# Patient Record
Sex: MALE | Race: WHITE | NOT HISPANIC OR LATINO | Employment: FULL TIME | URBAN - NONMETROPOLITAN AREA
[De-identification: names, ages, dates, MRNs, and addresses within clinical notes are randomized per-mention and may not be internally consistent; named-entity substitution may affect disease eponyms.]

---

## 2017-11-21 ENCOUNTER — HISTORY (OUTPATIENT)
Dept: FAMILY MEDICINE | Facility: OTHER | Age: 57
End: 2017-11-21

## 2017-11-21 ENCOUNTER — HOSPITAL ENCOUNTER (OUTPATIENT)
Dept: RADIOLOGY | Facility: OTHER | Age: 57
End: 2017-11-21
Attending: FAMILY MEDICINE

## 2017-11-21 ENCOUNTER — OFFICE VISIT - GICH (OUTPATIENT)
Dept: FAMILY MEDICINE | Facility: OTHER | Age: 57
End: 2017-11-21

## 2017-11-21 DIAGNOSIS — Z12.5 ENCOUNTER FOR SCREENING FOR MALIGNANT NEOPLASM OF PROSTATE: ICD-10-CM

## 2017-11-21 DIAGNOSIS — Z13.228 ENCOUNTER FOR SCREENING FOR OTHER METABOLIC DISORDERS: ICD-10-CM

## 2017-11-21 DIAGNOSIS — M12.88 OTHER SPECIFIC ARTHROPATHIES, NOT ELSEWHERE CLASSIFIED, OTHER SPECIFIED SITE: ICD-10-CM

## 2017-11-21 DIAGNOSIS — Z13.0 ENCOUNTER FOR SCREENING FOR DISEASES OF THE BLOOD AND BLOOD-FORMING ORGANS AND CERTAIN DISORDERS INVOLVING THE IMMUNE MECHANISM: ICD-10-CM

## 2017-11-21 DIAGNOSIS — Z00.00 ENCOUNTER FOR GENERAL ADULT MEDICAL EXAMINATION WITHOUT ABNORMAL FINDINGS: ICD-10-CM

## 2017-11-21 DIAGNOSIS — Z13.220 ENCOUNTER FOR SCREENING FOR LIPOID DISORDERS: ICD-10-CM

## 2017-11-21 DIAGNOSIS — Z13.6 ENCOUNTER FOR SCREENING FOR CARDIOVASCULAR DISORDERS: ICD-10-CM

## 2017-11-21 DIAGNOSIS — S90.222A: ICD-10-CM

## 2017-11-21 LAB
A/G RATIO - HISTORICAL: 1.6 (ref 1–2)
ABSOLUTE BASOPHILS - HISTORICAL: 0.1 THOU/CU MM
ABSOLUTE EOSINOPHILS - HISTORICAL: 0.2 THOU/CU MM
ABSOLUTE IMMATURE GRANULOCYTES(METAS,MYELOS,PROS) - HISTORICAL: 0 THOU/CU MM
ABSOLUTE LYMPHOCYTES - HISTORICAL: 1.6 THOU/CU MM (ref 0.9–2.9)
ABSOLUTE MONOCYTES - HISTORICAL: 0.5 THOU/CU MM
ABSOLUTE NEUTROPHILS - HISTORICAL: 3.8 THOU/CU MM (ref 1.7–7)
ALBUMIN SERPL-MCNC: 4.3 G/DL (ref 3.5–5.7)
ALP SERPL-CCNC: 60 IU/L (ref 34–104)
ALT (SGPT) - HISTORICAL: 21 IU/L (ref 7–52)
ANION GAP - HISTORICAL: 9 (ref 5–18)
AST SERPL-CCNC: 23 IU/L (ref 13–39)
BASOPHILS # BLD AUTO: 1.1 %
BILIRUB SERPL-MCNC: 0.7 MG/DL (ref 0.3–1)
BUN SERPL-MCNC: 20 MG/DL (ref 7–25)
BUN/CREAT RATIO - HISTORICAL: 23
CALCIUM SERPL-MCNC: 9.4 MG/DL (ref 8.6–10.3)
CHLORIDE SERPLBLD-SCNC: 106 MMOL/L (ref 98–107)
CHOL/HDL RATIO - HISTORICAL: 4.35
CHOLESTEROL TOTAL: 174 MG/DL
CO2 SERPL-SCNC: 22 MMOL/L (ref 21–31)
CREAT SERPL-MCNC: 0.86 MG/DL (ref 0.7–1.3)
EOSINOPHIL NFR BLD AUTO: 2.4 %
ERYTHROCYTE [DISTWIDTH] IN BLOOD BY AUTOMATED COUNT: 12.5 % (ref 11.5–15.5)
GFR IF NOT AFRICAN AMERICAN - HISTORICAL: >60 ML/MIN/1.73M2
GLOBULIN - HISTORICAL: 2.7 G/DL (ref 2–3.7)
GLUCOSE SERPL-MCNC: 85 MG/DL (ref 70–105)
HCT VFR BLD AUTO: 43.4 % (ref 37–53)
HDLC SERPL-MCNC: 40 MG/DL (ref 23–92)
HEMOGLOBIN: 14.7 G/DL (ref 13.5–17.5)
IMMATURE GRANULOCYTES(METAS,MYELOS,PROS) - HISTORICAL: 0.2 %
LDLC SERPL CALC-MCNC: 110 MG/DL
LYMPHOCYTES NFR BLD AUTO: 25.2 % (ref 20–44)
MCH RBC QN AUTO: 30.6 PG (ref 26–34)
MCHC RBC AUTO-ENTMCNC: 33.9 G/DL (ref 32–36)
MCV RBC AUTO: 90 FL (ref 80–100)
MONOCYTES NFR BLD AUTO: 8.8 %
NEUTROPHILS NFR BLD AUTO: 62.3 % (ref 42–72)
NON-HDL CHOLESTEROL - HISTORICAL: 134 MG/DL
PLATELET # BLD AUTO: 256 THOU/CU MM (ref 140–440)
PMV BLD: 10.3 FL (ref 6.5–11)
POTASSIUM SERPL-SCNC: 4.3 MMOL/L (ref 3.5–5.1)
PROT SERPL-MCNC: 7 G/DL (ref 6.4–8.9)
PROVIDER ORDERDED STATUS - HISTORICAL: ABNORMAL
PSA TOTAL (DIAGNOSTIC) - HISTORICAL: 2.79 NG/ML
RED BLOOD COUNT - HISTORICAL: 4.8 MIL/CU MM (ref 4.3–5.9)
SODIUM SERPL-SCNC: 137 MMOL/L (ref 133–143)
TRIGL SERPL-MCNC: 118 MG/DL
WHITE BLOOD COUNT - HISTORICAL: 6.2 THOU/CU MM (ref 4.5–11)

## 2017-11-21 ASSESSMENT — PATIENT HEALTH QUESTIONNAIRE - PHQ9: SUM OF ALL RESPONSES TO PHQ QUESTIONS 1-9: 1

## 2017-11-22 ENCOUNTER — HOSPITAL ENCOUNTER (OUTPATIENT)
Dept: RADIOLOGY | Facility: OTHER | Age: 57
End: 2017-11-22
Attending: FAMILY MEDICINE

## 2017-11-22 DIAGNOSIS — Z13.6 ENCOUNTER FOR SCREENING FOR CARDIOVASCULAR DISORDERS: ICD-10-CM

## 2017-12-07 ENCOUNTER — OFFICE VISIT - GICH (OUTPATIENT)
Dept: CHIROPRACTIC MEDICINE | Facility: OTHER | Age: 57
End: 2017-12-07

## 2017-12-07 DIAGNOSIS — M99.01 SEGMENTAL AND SOMATIC DYSFUNCTION OF CERVICAL REGION: ICD-10-CM

## 2017-12-07 DIAGNOSIS — M99.02 SEGMENTAL AND SOMATIC DYSFUNCTION OF THORACIC REGION: ICD-10-CM

## 2017-12-07 DIAGNOSIS — M51.34 OTHER INTERVERTEBRAL DISC DEGENERATION, THORACIC REGION: ICD-10-CM

## 2017-12-07 DIAGNOSIS — M12.88 OTHER SPECIFIC ARTHROPATHIES, NOT ELSEWHERE CLASSIFIED, OTHER SPECIFIED SITE: ICD-10-CM

## 2017-12-07 DIAGNOSIS — M54.2 CERVICALGIA: ICD-10-CM

## 2017-12-11 ENCOUNTER — OFFICE VISIT - GICH (OUTPATIENT)
Dept: CHIROPRACTIC MEDICINE | Facility: OTHER | Age: 57
End: 2017-12-11

## 2017-12-11 ENCOUNTER — AMBULATORY - GICH (OUTPATIENT)
Dept: FAMILY MEDICINE | Facility: OTHER | Age: 57
End: 2017-12-11

## 2017-12-11 DIAGNOSIS — D22.9 MELANOCYTIC NEVUS: ICD-10-CM

## 2017-12-11 DIAGNOSIS — M99.03 SEGMENTAL AND SOMATIC DYSFUNCTION OF LUMBAR REGION: ICD-10-CM

## 2017-12-11 DIAGNOSIS — M54.2 CERVICALGIA: ICD-10-CM

## 2017-12-11 DIAGNOSIS — M99.01 SEGMENTAL AND SOMATIC DYSFUNCTION OF CERVICAL REGION: ICD-10-CM

## 2017-12-11 DIAGNOSIS — M12.88 OTHER SPECIFIC ARTHROPATHIES, NOT ELSEWHERE CLASSIFIED, OTHER SPECIFIED SITE: ICD-10-CM

## 2017-12-11 DIAGNOSIS — M51.34 OTHER INTERVERTEBRAL DISC DEGENERATION, THORACIC REGION: ICD-10-CM

## 2017-12-11 DIAGNOSIS — M99.02 SEGMENTAL AND SOMATIC DYSFUNCTION OF THORACIC REGION: ICD-10-CM

## 2017-12-18 ENCOUNTER — AMBULATORY - GICH (OUTPATIENT)
Dept: FAMILY MEDICINE | Facility: OTHER | Age: 57
End: 2017-12-18

## 2017-12-18 DIAGNOSIS — L60.8 OTHER NAIL DISORDERS: ICD-10-CM

## 2017-12-18 DIAGNOSIS — D22.9 MELANOCYTIC NEVUS: ICD-10-CM

## 2017-12-28 ENCOUNTER — AMBULATORY - GICH (OUTPATIENT)
Dept: SCHEDULING | Facility: OTHER | Age: 57
End: 2017-12-28

## 2017-12-28 NOTE — PROGRESS NOTES
Patient Information     Patient Name MRN Sex Alfie Nash [id number] Male 1960      Progress Notes by Ruben Ramos MD at 2017 10:15 AM     Author:  Ruben Ramos MD Service:  (none) Author Type:  Physician     Filed:  2017  1:24 PM Encounter Date:  2017 Status:  Signed     :  Ruben Ramos MD (Physician)            Nursing Notes:   Caitlyn Sepulveda  2017 10:36 AM  Signed  He has had back pain for 1.5 years and it is not getting any better. He also has a funny lesion on his left great toe.  Caitlyn Sepulveda LPN..................2017   10:30 AM      SUBJECTIVE:  Alfie Otoole  is a 57 y.o. male who comes in today for a couple concerns.    He's had back pain for a year and a half and it doesn't seem to be getting any better. He has had mid to low thoracic back pain that is better with ibuprofen. It seems to be getting worse. It started after skiing. It has been more consistent of late. He used to do a lot of heavy backpacking and had an old hyperextension injury.      He has a lesion on his left great toe he likely look at.    Past Medical, Family, and Social History reviewed and updated as noted below.   ROS is negative except as noted above       No Known Allergies,   Family History       Problem   Relation Age of Onset     Heart Disease  Father      CHF       Cancer-breast  Mother      Heart Disease  Mother      afib       Cancer-prostate  Maternal Grandfather      Coronary artery disease  Maternal Uncle      smoker     ,   No current outpatient prescriptions on file prior to visit.     No current facility-administered medications on file prior to visit.    ,   Past Medical History:     Diagnosis  Date     Chronic back pain     Chronic back pain    ,   Patient Active Problem List      Diagnosis Date Noted     GERD 2012     CONSTIPATION 2012   ,   Past Surgical History:      Procedure  Laterality Date     COLON EGD  2012    F/U   EGD /  "Colonoscopy      and   Social History       Substance Use Topics         Smoking status:   Never Smoker     Smokeless tobacco:   Never Used     Alcohol use   Yes      Comment: once or twice a week one beer      OBJECTIVE:  /74  Pulse 68  Ht 1.911 m (6' 3.25\")  Wt 106.5 kg (234 lb 12.8 oz)  BMI 29.15 kg/m2   EXAM:  General Appearance: Pleasant, alert, appropriate appearance for age. No acute distress  Head Exam: Normal. Normocephalic, atraumatic.  Eye Exam:  Normal external eye, conjunctiva, lids, cornea. BEN. EOMI  Ear Exam: Normal TM's bilaterally. Normal auditory canals and external ears. Non-tender.  Nose Exam: Normal external nose, mucus membranes, and septum.  OroPharynx Exam:  Dental hygiene adequate. Normal buccal mucosa. Normal pharynx.  Neck Exam:  Supple, no masses or nodes. No audible bruits  Thyroid Exam: No nodules or enlargement.  Chest/Respiratory Exam: Normal chest wall and respirations. Clear to auscultation.  Cardiovascular Exam: Regular rate and rhythm. S1, S2, no murmur, click, gallop, or rubs.  Gastrointestinal Exam: Soft, non-tender, no masses or organomegaly.  Lymphatic Exam: Non-palpable nodes in neck, clavicular, axillary, or inguinal regions.  Musculoskeletal Exam: Back is straight and non-tender, full ROM of upper and lower extremities. Lipoma on the lumbar region on the right. He has some asymmetry at about T9 on the right but he has normal rotational range of motion. Some spasm noted in the right T9 area.  Foot Exam: Left and right foot: good pedal pulses, no lesions, nail hygiene good. left great toenail with dark lesion at the base of the nail. Appears to be a resolving subungual hematoma under magnification. Ridge on the nail distal to the dark area and base seems to be lightening.  Skin: no rash or abnormalities  Neurologic Exam: Nonfocal, normal gross motor, tone coordination and no tremor.  Psychiatric Exam: Alert and oriented - appropriate affect.     Results for orders " placed or performed in visit on 11/21/17      LIPID PANEL      Result  Value Ref Range    CHOLESTEROL,TOTAL 174 <200 mg/dL    TRIGLYCERIDES 118 <150 mg/dL    HDL CHOLESTEROL 40 23 - 92 mg/dL    NON-HDL CHOLESTEROL 134 <145 mg/dl    CHOL/HDL RATIO            4.35 <4.50                    LDL CHOLESTEROL 110 (H) <100 mg/dL    PROVIDER ORDERED STATUS RANDOM    PSA TOTAL (DIAGNOSTIC)      Result  Value Ref Range    PSA TOTAL (DIAGNOSTIC) 2.788 <=3.100 ng/mL   COMP METABOLIC PANEL      Result  Value Ref Range    SODIUM 137 133 - 143 mmol/L    POTASSIUM 4.3 3.5 - 5.1 mmol/L    CHLORIDE 106 98 - 107 mmol/L    CO2,TOTAL 22 21 - 31 mmol/L    ANION GAP 9 5 - 18                    GLUCOSE 85 70 - 105 mg/dL    CALCIUM 9.4 8.6 - 10.3 mg/dL    BUN 20 7 - 25 mg/dL    CREATININE 0.86 0.70 - 1.30 mg/dL    BUN/CREAT RATIO           23                    GFR if African American >60 >60 ml/min/1.73m2    GFR if not African American >60 >60 ml/min/1.73m2    ALBUMIN 4.3 3.5 - 5.7 g/dL    PROTEIN,TOTAL 7.0 6.4 - 8.9 g/dL    GLOBULIN                  2.7 2.0 - 3.7 g/dL    A/G RATIO 1.6 1.0 - 2.0                    BILIRUBIN,TOTAL 0.7 0.3 - 1.0 mg/dL    ALK PHOSPHATASE 60 34 - 104 IU/L    ALT (SGPT) 21 7 - 52 IU/L    AST (SGOT) 23 13 - 39 IU/L   CBC WITH AUTO DIFFERENTIAL      Result  Value Ref Range    WHITE BLOOD COUNT         6.2 4.5 - 11.0 thou/cu mm    RED BLOOD COUNT           4.80 4.30 - 5.90 mil/cu mm    HEMOGLOBIN                14.7 13.5 - 17.5 g/dL    HEMATOCRIT                43.4 37.0 - 53.0 %    MCV                       90 80 - 100 fL    MCH                       30.6 26.0 - 34.0 pg    MCHC                      33.9 32.0 - 36.0 g/dL    RDW                       12.5 11.5 - 15.5 %    PLATELET COUNT            256 140 - 440 thou/cu mm    MPV                       10.3 6.5 - 11.0 fL    NEUTROPHILS               62.3 42.0 - 72.0 %    LYMPHOCYTES               25.2 20.0 - 44.0 %    MONOCYTES                 8.8 <12.0 %     EOSINOPHILS               2.4 <8.0 %    BASOPHILS                 1.1 <3.0 %    IMMATURE GRANULOCYTES(METAS,MYELOS,PROS) 0.2 %    ABSOLUTE NEUTROPHILS      3.8 1.7 - 7.0 thou/cu mm    ABSOLUTE LYMPHOCYTES      1.6 0.9 - 2.9 thou/cu mm    ABSOLUTE MONOCYTES        0.5 <0.9 thou/cu mm    ABSOLUTE EOSINOPHILS      0.2 <0.5 thou/cu mm    ABSOLUTE BASOPHILS        0.1 <0.3 thou/cu mm    ABSOLUTE IMMATURE GRANULOCYTES(METAS,MYELOS,PROS) 0.0 <=0.3 thou/cu mm      XR SPINE THORACIC 2 VIEWS     HISTORY:  Thoracic facet joint syndrome. Midthoracic spine pain     TECHNIQUE: Thoracic spine 2 views.     COMPARISON: None.     FINDINGS:     There is mild reverse S shaped scoliosis. Vertebral body heights are maintained. Disc spaces are mildly narrowed with degenerative endplate changes and osteophytes. No acute fracture is seen.     IMPRESSION: Mild scoliosis and multilevel degenerative disease.       Electronically Signed By: Sheron Franco M.D. on 11/21/2017 11:42 AM  ASSESSMENT/Plan :      Alfie was seen today for back pain.    Diagnoses and all orders for this visit:    Visit for preventive health examination    Thoracic facet joint syndrome  -     XR SPINE THORACIC 2 VIEWS; Future  -     AMB CONSULT TO CHIROPRACTIC; Future    Screening for hyperlipidemia  -     LIPID PANEL; Future  -     LIPID PANEL    Screening for prostate cancer  -     PSA TOTAL (DIAGNOSTIC); Future  -     PSA TOTAL (DIAGNOSTIC)    Screening for deficiency anemia  -     CBC AND DIFFERENTIAL; Future  -     CBC AND DIFFERENTIAL  -     CBC WITH AUTO DIFFERENTIAL    Screening for metabolic disorder  -     COMP METABOLIC PANEL; Future  -     COMP METABOLIC PANEL    Screening for cardiovascular condition  -     CT CARDIAC CALCIUM SCORE ONLY WO SINGLE READ; Future     mild scoliosis likely predisposes to thoracic facet dysfunction. Referred for chiropractic evaluation. Might consider referral to physical therapy as well. Reviewed x-ray with him which shows  degenerative change.    Will notify of lab results when available. We'll go ahead with CT cardiac calcium score to see if we need to risk stratify him to a higher risk group.    Reassured with regard to this lesion under his toenail which I think is just a resolving subungual hematoma. We will employ expectant management and reevaluate if it persists over several months.      Ruben Ramos MD

## 2017-12-30 NOTE — NURSING NOTE
Patient Information     Patient Name MRN Sex Alfie Nash [id number] Male 1960      Nursing Note by Caitlyn Sepulveda at 2017 10:15 AM     Author:  Caitlyn Sepulveda Service:  (none) Author Type:  (none)     Filed:  2017 10:36 AM Encounter Date:  2017 Status:  Signed     :  Caitlyn Sepulveda            He has had back pain for 1.5 years and it is not getting any better. He also has a funny lesion on his left great toe.  Caitlyn Sepulveda LPEDITH..................2017   10:30 AM

## 2018-01-27 VITALS
HEIGHT: 75 IN | DIASTOLIC BLOOD PRESSURE: 74 MMHG | SYSTOLIC BLOOD PRESSURE: 138 MMHG | HEART RATE: 68 BPM | BODY MASS INDEX: 29.19 KG/M2 | WEIGHT: 234.8 LBS

## 2018-02-04 ASSESSMENT — PATIENT HEALTH QUESTIONNAIRE - PHQ9: SUM OF ALL RESPONSES TO PHQ QUESTIONS 1-9: 1

## 2018-02-09 VITALS
SYSTOLIC BLOOD PRESSURE: 125 MMHG | WEIGHT: 232.4 LBS | HEIGHT: 75 IN | TEMPERATURE: 98.9 F | BODY MASS INDEX: 28.89 KG/M2 | RESPIRATION RATE: 16 BRPM | DIASTOLIC BLOOD PRESSURE: 75 MMHG | HEART RATE: 58 BPM

## 2018-02-12 NOTE — PATIENT INSTRUCTIONS
Patient Information     Patient Name MRN Sex Alfie Nash [id number] Male 1960      Patient Instructions by Nunu Hernandez DC at 2017  1:30 PM     Author:  Nunu Hernandez DC  Service:  (none) Author Type:  [facility]- Other provider     Filed:  2017  2:59 PM  Encounter Date:  2017 Status:  Addendum     :  Nunu Hernandez DC ([facility]- Other provider)        Related Notes: Original Note by Nunu Hernandez DC ([facility]- Other provider) filed at 2017  2:37 PM              2017  Plan of Care:  Short term 5-8 visitsof Chiropractic Care including Spinal Adjustments and/or physiotherapy and active rehabilitation, to include exercises in the office and/or at home to meet care plan goals.     Frequency: 2xweek for up to 2-4 weeks   POC discussed and patient agreeable to plan of care.      2017 Goals:  To be met by Re-eval in 2-4 weeks approx -.      Patient will report improved pain.   Patient will demonstrate an improved ability to complete Activities of Daily Living  as shown by a reported reduced score on neck and back index.    Patient will demonstrate improved ROM and motion on palpation testing to be able to turn to check traffic driving better..        INSTRUCTIONS   Apply ice to area for 15-20 min. to reduce pain/muscle soreness, spasm, and swelling/inflammation.  Repeat every 2 hours as needed.   This is a good time to practice other healthy choices to reduce physical, chemical and emotional stressors that reduce your body's ability to recover.    Remain hopeful.  Return 2-4 days.  Home exercise program:    Cat/cow    side-lying shoulder circles    side-lying reach and roll   straight back hip hinge with bending    2017 recommend spinal adjusting and therapeutic exercise  frequency twice a week for 2-3 weeks then re-eval approximately 1214 through 1221.   May consider referral for cotreatment physical therapy

## 2018-02-12 NOTE — PATIENT INSTRUCTIONS
Patient Information     Patient Name MRN Sex Alfie Nash [id number] Male 1960      Patient Instructions by Nunu Hernandez DC at 2017  3:30 PM     Author:  Nunu Hernandez DC  Service:  (none) Author Type:  [facility]- Other provider     Filed:  2017  4:01 PM  Encounter Date:  2017 Status:  Addendum     :  Nunu Hernandez DC ([facility]- Other provider)        Related Notes: Original Note by Nunu Hernandez DC ([facility]- Other provider) filed at 2017  3:35 PM              2017  Plan of Care:  Short term 5-8 visitsof Chiropractic Care including Spinal Adjustments and/or physiotherapy and active rehabilitation, to include exercises in the office and/or at home to meet care plan goals.     Frequency: 2xweek for up to 2-4 weeks   POC discussed and patient agreeable to plan of care.      2017 Goals:  To be met by Re-eval in 2-4 weeks approx -.      Patient will report improved pain.   Patient will demonstrate an improved ability to complete Activities of Daily Living  as shown by a reported reduced score on neck and back index.    Patient will demonstrate improved ROM and motion on palpation testing to be able to turn to check traffic driving better..        INSTRUCTIONS   continue plan of care    Apply ice to area for 15-20 min. to reduce pain/muscle soreness, spasm, and swelling/inflammation.  Repeat every 2 hours as needed.   Return 2-4 days.  Home exercise program:    Cat/cow    side-lying shoulder circles    side-lying reach and roll   straight back hip hinge with bending    2017 recommend spinal adjusting and therapeutic exercise  frequency twice a week for 2-3 weeks then re-eval approximately 1221.   May consider referral for cotreatment physical therapy

## 2018-02-12 NOTE — PROGRESS NOTES
"Patient Information     Patient Name MRN Sex Alfie Nash [id number] Male 1960      Progress Notes by Nunu Hernandez DC at 2017  3:30 PM     Author:  Nunu Hernandez DC Service:  (none) Author Type:  [facility]- Other provider     Filed:  2017  4:05 PM Encounter Date:  2017 Status:  Signed     :  Nunu Hernandez DC ([facility]- Other provider)            PATIENT:  Alfie Otoole is a 57 y.o. male    PROBLEM:  Date of Initial Visit for this Episode:  2017  Chief Complaint     Patient presents with       Chiropractic Care      f/u    2017 Visit #2/5-8    SUBJECTIVE: Felt a little sore after initial adjustments but went skiing and had more motion.  Noticed after increased stretching to the low back area did have one \"catching\" in his lower back with sitting to standing with mild pain.    17 initial HPI:   Description, Duration and Location of Primary Problem: Chronic Mid to lower thoracic stiffness and aching pain, gradually worsening past one and a half years.   Saw primary care provider Dr. Ramos and x-rays were ordered showing multilevel mid to lower thoracic degenerative disease. Mechanism of Injury: gradual  Frequent aching and stiffness, more to the right about T7-9 level he points to  Radiation of pain: no  Pain rated 2/10 at it's worst is moderate and comes and goes and 0/10 at it's best.  Worse with: Bending, more moderate walking slowly on recent vacation visiting daughter  Improved by: Can stretching get back to release usually or will take ibuprofen about 3x month as needed. Occasional he sits in a hot tub. Does not use ice and heat or stretch regularly.  Previous injury:  During [employer] Hyperextended back falling into bleachers playing basketball.  College carried heavy backpacks and canoes camping.     ROS:  A comprehensive review of systems was negative.    2017  Neck index n/a    Back index 20%    Functional limitations:    Other Health Care " "Providers seen for this: Dr. Ramos  Work Habits:   Exercise habits:  xcountry skiing. No formal stretching.  Sleeping habits:  On a firm futon  Hobbies/Interests:  Past D.C. Care: Yes      OBJECTIVE:    DIAGNOSTICS: Reviewed views independently and results with patient.  Left mild upper thoracic convexity with moderate mid to lower multilevel thoracic degenerative disc changes and osteophytes.    Procedure: XR SPINE THORACIC 2 VIEWS   HISTORY:  Thoracic facet joint syndrome. Midthoracic spine pain   TECHNIQUE: Thoracic spine 2 views.   COMPARISON: None.   FINDINGS:  There is mild reverse S shaped scoliosis. Vertebral body heights are maintained. Disc spaces are mildly narrowed with degenerative endplate changes and osteophytes. No acute fracture is seen.   IMPRESSION: Mild scoliosis and multilevel degenerative disease.     Electronically Signed By: Sheron Franco M.D. on 11/21/2017 11:42 AM      12/7/17 initial PHYSICAL EXAM:   Skin: no rash or abnormalities. Lipoma palpated to the right of T9-10 level in paraspinals.    Musculoskeletal Exam:   Posture: Mild to moderate Anterior head carriage, right rounded shoulders.  Level iliac crest, Low right shoulder, Low left with left rotation occiput.    Gait: unremarkable.     Cervical ROM:   smooth arc of motion   40/50 flexion    35/45 extension    25/45 RLF  35/45 LLF    80/85 RR         75/85 LR     -VBI   -Maximal Foraminal Compression: + Ipsilateral bilateral focal lower neck pain.   -Distraction and shoulder depression stretching improves    Thoracic   active upright ROM:     60/60 flexion 45/60 extension    35/45 RLF    30/45 LLF    40/45 RR      35/45 LR \"feels good to stretch \"  quadruped moderate restricted left and right rotation    Lumbar ROM:     Upright flexion normal, reduced  Extension, RLF< LLF   seated 10 touch toes  - Straight leg raise: Bilateral 80  with posterior pelvic tilt     -Kemps  +Gillets: +VERENICE mild  -Leg length inequality    +Tenderness: " Suboccipital, C5-6, T2-4, T6-7, T7-8, T8-9   +Muscle spasm:  Moderate to markedsuboccipitals,  scalenes, moderate left greater than right levator scapula, trapezius, pectorals, thoracic paraspinals.   +Joint asymmetry and restriction:  Occiput left, C5 right, left second rib posterior, T3 right, T8 right with marked restriction.    12/11/2017   +Leg length inequality  +Tenderness: Suboccipital, C5-6, T2-4, right rhomboids, T6-7, L5    +Muscle spasm:  Moderate suboccipitals, scalenes, moderate left greater than right levator scapula, trapezius, pectorals, thoracic paraspinals, moderate right greater than left lumbar paraspinals.   +Joint asymmetry and restriction:  Occiput left, C5 right, left second rib posterior, T3 right, T7 left , L5 right      ASSESSMENT: Alfie Otoole is a 57 y.o. male  with chronic midline thoracic pain related to degenerative changes.       ICD-10-CM    1. Thoracic facet joint syndrome M12.88 [place] CHIRO SPINAL MANIP 3-4 [facility] ONLY   2. Segmental and somatic dysfunction of thoracic region M99.02 [place] CHIRO SPINAL MANIP 3-4 [facility] ONLY   3. DDD (degenerative disc disease), thoracic M51.34 [place] CHIRO SPINAL MANIP 3-4 [facility] ONLY   4. Cervicalgia M54.2 [place] CHIRO SPINAL MANIP 3-4 [facility] ONLY   5. Segmental and somatic dysfunction of cervical region M99.01 [place] CHIRO SPINAL MANIP 3-4 [facility] ONLY   6. Segmental and somatic dysfunction of lumbar region M99.03 [place] CHIRO SPINAL MANIP 3-4 [facility] ONLY   7. Facet syndrome, lumbar M12.88 [place] CHIRO SPINAL MANIP 3-4 [facility] ONLY       PLAN    Care:  Risks and benefits were explained and patient agreed to proceed with today's proposed care.   Spinal Adjustments to noted levels using Diversified supine cervical, prone thoracic, side posture lumbopelvic clear.  Brief 5 minutes if he make compression and myofascial release thoracic and lumbar area. Noted marked right rhomboid myofascial tension.  Patient Response:  Good release  and improve mobility to the cervical and upper thoracic, marked restriction of lower thoracic.      12/7/2017  Plan of Care:  Short term 5-8 visitsof Chiropractic Care including Spinal Adjustments and/or physiotherapy and active rehabilitation, to include exercises in the office and/or at home to meet care plan goals.     Frequency: 2xweek for up to 2-4 weeks   POC discussed and patient agreeable to plan of care.      12/7/2017 Goals:  To be met by Re-eval in 2-4 weeks approx 12/14-21/2018.      Patient will report improved pain.   Patient will demonstrate an improved ability to complete Activities of Daily Living  as shown by a reported reduced score on neck and back index.    Patient will demonstrate improved ROM and motion on palpation testing to be able to turn to check traffic driving better.       INSTRUCTIONS   continue plan of care    Apply ice to area for 15-20 min. to reduce pain/muscle soreness, spasm, and swelling/inflammation.  Repeat every 2 hours as needed.   Return 2-4 days.  Home exercise program:    Cat/cow    side-lying shoulder circles    side-lying reach and roll   straight back hip hinge with bending    12/7/2017 recommend spinal adjusting and therapeutic exercise  frequency twice a week for 2-3 weeks then re-eval approximately 1221.   May consider referral for cotreatment physical therapy

## 2018-02-12 NOTE — PROGRESS NOTES
Patient Information     Patient Name MRN Sex Alfie Nash [id number] Male 1960      Progress Notes by Nunu Hernandez DC at 2017  1:30 PM     Author:  Nunu Hernandez DC Service:  (none) Author Type:  INT [facility]ER- Other provider     Filed:  2017  3:22 PM Encounter Date:  2017 Status:  Signed     :  Nunu Hernandez DC (INT THER- Other provider)            PATIENT:  Alfie Otoole is a 57 y.o. male    PROBLEM:  Date of Initial Visit for this Episode:  2017  Chief Complaint     Patient presents with       Back Pain      Thoracic     2017 Visit #1/5-8    SUBJECTIVE / HPI:   Description, Duration and Location of Primary Problem: Chronic Mid to lower thoracic stiffness and aching pain, gradually worsening past one and a half years.   Saw primary care provider Dr. Ramos and x-rays were ordered showing multilevel mid to lower thoracic degenerative disease. Mechanism of Injury: gradual  Frequent aching and stiffness, more to the right about T7-9 level he points to  Radiation of pain: no  Pain rated 2/10 at it's worst is moderate and comes and goes and 0/10 at it's best.  Worse with: Bending, more moderate walking slowly on recent vacation visiting daughter  Improved by: Can stretching get back to release usually or will take ibuprofen about 3x month as needed. Occasional he sits in a hot tub. Does not use ice and heat or stretch regularly.  Previous injury:  During H.S. Hyperextended back falling into bleachers playing basketball.  College carried heavy backpacks and canoes camping.     ROS:  A comprehensive review of systems was negative.    2017  Neck index n/a    Back index 20%    Functional limitations:    Other Health Care Providers seen for this: Dr. Ramos  Work Habits:   Exercise habits:  xcountry skiing. No formal stretching.  Sleeping habits:  On a firm futon  Hobbies/Interests:  Past D.C. Care:         Patient Active Problem List     Diagnosis  Code      GERD K21.9     CONSTIPATION K59.00     Past Medical History:     Diagnosis  Date     Chronic back pain     Chronic back pain      Past Surgical History:      Procedure  Laterality Date     COLON EGD  06/25/2012    F/U 2022  EGD / Colonoscopy       No current outpatient prescriptions on file.  Medications have been reviewed by me and are current to the best of my knowledge and ability.    Social History     Social History        Marital status:       Spouse name: N/A     Number of children:  N/A     Years of education:  N/A     Occupational History      Not on file.     Social History Main Topics         Smoking status:   Never Smoker     Smokeless tobacco:   Never Used     Alcohol use   Yes      Comment: once or twice a week one beer      Drug use:   No     Sexual activity:   Not on file     Other Topics  Concern     Not on file      Social History Narrative     , 4 children, ER physician        Maikel is in [facility] for study abroad. She is a senior at [employer] and is premed.    Prem is a sophomore at [employer]        9th and 11th graders still at home.      Family History       Problem   Relation Age of Onset     Heart Disease  Father      CHF       Cancer-breast  Mother      Heart Disease  Mother      afib       Cancer-prostate  Maternal Grandfather      Coronary artery disease  Maternal Uncle      smoker     +family history siblings with similar mild kyphotic posture, denies other family history of arthritis.    Allergies:  Review of patient's allergies indicates no known allergies.    OBJECTIVE:    DIAGNOSTICS: Reviewed views independently and results with patient.  Left mild upper thoracic convexity with moderate mid to lower multilevel thoracic degenerative disc changes and osteophytes.    Procedure: XR SPINE THORACIC 2 VIEWS   HISTORY:  Thoracic facet joint syndrome. Midthoracic spine pain   TECHNIQUE: Thoracic spine 2 views.   COMPARISON: None.   FINDINGS:  There is mild reverse S shaped  "scoliosis. Vertebral body heights are maintained. Disc spaces are mildly narrowed with degenerative endplate changes and osteophytes. No acute fracture is seen.   IMPRESSION: Mild scoliosis and multilevel degenerative disease.     Electronically Signed By: Sheron Franco M.D. on 11/21/2017 11:42 AM      PHYSICAL EXAM:   /75  Pulse 58  Temp 98.9  F (37.2  C)  Resp 16  Ht 1.905 m (6' 3\")  Wt 105.4 kg (232 lb 6.4 oz)  BMI 29.05 kg/m2    General Appearance: Normal.  Skin: no rash or abnormalities. Lipoma palpated to the right of T9-10 level in paraspinals.    Musculoskeletal Exam:   Posture: Mild to moderate Anterior head carriage, right rounded shoulders.  Level iliac crest, Low right shoulder, Low left with left rotation occiput.    Gait: unremarkable.     Cervical ROM:   smooth arc of motion   40/50 flexion    35/45 extension    25/45 RLF  35/45 LLF    80/85 RR         75/85 LR     -VBI   -Maximal Foraminal Compression: + Ipsilateral bilateral focal lower neck pain.   -Distraction and shoulder depression stretching improves    Thoracic   active upright ROM:     60/60 flexion 45/60 extension    35/45 RLF    30/45 LLF    40/45 RR      35/45 LR \"feels good to stretch \"  quadruped moderate restricted left and right rotation    Lumbar ROM:     Upright flexion normal, reduced  Extension, RLF< LLF   seated 10 touch toes  - Straight leg raise: Bilateral 80  with posterior pelvic tilt     -Kemps  +Gillets: +VERENICE mild  -Leg length inequality    +Tenderness: Suboccipital, C5-6, T2-4, T6-7, T7-8, T8-9   +Muscle spasm:  Moderate to markedsuboccipitals,  scalenes, moderate left greater than right levator scapula, trapezius, pectorals, thoracic paraspinals.   +Joint asymmetry and restriction:  Occiput left, C5 right, left second rib posterior, T3 right, T8 right with marked restriction.    ASSESSMENT: Alfie Otoole is a 57 y.o. male  with chronic midline thoracic pain related to degenerative changes.       ICD-10-CM  "   1. Segmental and somatic dysfunction of thoracic region M99.02 NE CHIRO SPINAL MANIP 1-2 REGIONS GICH ONLY      NE THERAPEUTIC EXERCISES EA 15 MIN   2. Thoracic facet joint syndrome M12.88 AMB CONSULT TO CHIROPRACTIC      NE CHIRO SPINAL MANIP 1-2 REGIONS GICH ONLY      NE THERAPEUTIC EXERCISES EA 15 MIN   3. DDD (degenerative disc disease), thoracic M51.34 NE CHIRO SPINAL MANIP 1-2 REGIONS GICH ONLY      NE THERAPEUTIC EXERCISES EA 15 MIN   4. Cervicalgia M54.2 NE CHIRO SPINAL MANIP 1-2 REGIONS GICH ONLY      NE THERAPEUTIC EXERCISES EA 15 MIN   5. Segmental and somatic dysfunction of cervical region M99.01 NE CHIRO SPINAL MANIP 1-2 REGIONS GICH ONLY      NE THERAPEUTIC EXERCISES EA 15 MIN       PLAN    Care:  Risks and benefits were explained and patient agreed to proceed with today's proposed care.   Spinal Adjustments to noted levels using Diversified supine cervical, prone thoracic, lumbopelvic clear.  Patient Response:  Good release and improve mobility to the cervical and upper thoracic, marked restriction of lower thoracic.    12/7/2017  Plan of Care:  Short term 5-8 visitsof Chiropractic Care including Spinal Adjustments and/or physiotherapy and active rehabilitation, to include exercises in the office and/or at home to meet care plan goals.     Frequency: 2xweek for up to 2-4 weeks   POC discussed and patient agreeable to plan of care.      12/7/2017 Goals:  To be met by Re-eval in 2-4 weeks approx 12/14-21/2018.      Patient will report improved pain.   Patient will demonstrate an improved ability to complete Activities of Daily Living  as shown by a reported reduced score on neck and back index.    Patient will demonstrate improved ROM and motion on palpation testing to be able to turn to check traffic driving better..        INSTRUCTIONS   Apply ice to area for 15-20 min. to reduce pain/muscle soreness, spasm, and swelling/inflammation.  Repeat every 2 hours as needed.   This is a good time to  practice other healthy choices to reduce physical, chemical and emotional stressors that reduce your body's ability to recover.    Remain hopeful.  Return 2-4 days.  Home exercise program:    Cat/cow    side-lying shoulder circles    side-lying reach and roll   straight back hip hinge with bending    12/7/2017 recommend spinal adjusting and therapeutic exercise  frequency twice a week for 2-3 weeks then re-eval approximately 1214 through 1221.   May consider referral for cotreatment physical therapy

## 2018-02-19 ENCOUNTER — DOCUMENTATION ONLY (OUTPATIENT)
Dept: FAMILY MEDICINE | Facility: OTHER | Age: 58
End: 2018-02-19

## 2018-02-20 ENCOUNTER — THERAPY VISIT (OUTPATIENT)
Dept: CHIROPRACTIC MEDICINE | Facility: OTHER | Age: 58
End: 2018-02-20
Attending: CHIROPRACTOR
Payer: COMMERCIAL

## 2018-02-20 DIAGNOSIS — M54.50 ACUTE RIGHT-SIDED LOW BACK PAIN WITHOUT SCIATICA: ICD-10-CM

## 2018-02-20 DIAGNOSIS — M51.34 OTHER INTERVERTEBRAL DISC DEGENERATION, THORACIC REGION: ICD-10-CM

## 2018-02-20 DIAGNOSIS — M54.6 RIGHT-SIDED THORACIC BACK PAIN: Primary | ICD-10-CM

## 2018-02-20 DIAGNOSIS — M99.04 SOMATIC DYSFUNCTION OF SPINE, SACRAL: ICD-10-CM

## 2018-02-20 DIAGNOSIS — M99.02 SEGMENTAL AND SOMATIC DYSFUNCTION OF THORACIC REGION: ICD-10-CM

## 2018-02-20 PROCEDURE — 97014 ELECTRIC STIMULATION THERAPY: CPT | Performed by: CHIROPRACTOR

## 2018-02-20 PROCEDURE — 98940 CHIROPRACT MANJ 1-2 REGIONS: CPT | Performed by: CHIROPRACTOR

## 2018-02-20 NOTE — PATIENT INSTRUCTIONS
INSTRUCTIONS    Resume plan of care   shown seated forward fold, discussed inversion positions vs. Table.    Apply ice or heat to area for 15-20 min. to reduce pain/muscle soreness, spasm, and swelling/inflammation.  Repeat every 2 hours as needed.   Return 2-7 days.  Home exercise program:       Cat/cow    side-lying shoulder circles    side-lying reach and roll   straight back hip hinge with bending      Back Exercises: Lower Back Stretch    To start, sit in a chair with your feet flat on the floor. Shift your weight slightly forward. Relax, and keep your ears, shoulders, and hips aligned.    Sit with your feet well apart.    Bend forward and touch the floor with the backs of your hands. Relax and let your body drop.    Hold for 20 seconds. Return to starting position.    Repeat 2 times.   Date Last Reviewed: 8/16/2015 2000-2017 The [employer]. [address] [place] [address]. All rights reserved. This information is not intended as a substitute for professional medical care. Always follow your healthcare professional's instructions.

## 2018-02-20 NOTE — MR AVS SNAPSHOT
After Visit Summary   2/20/2018    Alfie Otoole    MRN: [id number]           Patient Information     Date Of Birth          1960        Visit Information        Provider Department      2/20/2018 10:00 AM Nunu Hernandez DC [facility]        Today's Diagnoses     Right-sided thoracic back pain    -  1    Segmental and somatic dysfunction of thoracic region        Other intervertebral disc degeneration, thoracic region        Acute right-sided low back pain without sciatica        Somatic dysfunction of spine, sacral          Care Instructions         INSTRUCTIONS    Resume plan of care   shown seated forward fold, discussed inversion positions vs. Table.    Apply ice or heat to area for 15-20 min. to reduce pain/muscle soreness, spasm, and swelling/inflammation.  Repeat every 2 hours as needed.   Return 2-7 days.  Home exercise program:       Cat/cow    side-lying shoulder circles    side-lying reach and roll   straight back hip hinge with bending      Back Exercises: Lower Back Stretch    To start, sit in a chair with your feet flat on the floor. Shift your weight slightly forward. Relax, and keep your ears, shoulders, and hips aligned.    Sit with your feet well apart.    Bend forward and touch the floor with the backs of your hands. Relax and let your body drop.    Hold for 20 seconds. Return to starting position.    Repeat 2 times.   Date Last Reviewed: 8/16/2015 2000-2017 [employer]. [address] [place] [address]. All rights reserved. This information is not intended as a substitute for professional medical care. Always follow your healthcare professional's instructions.                Follow-ups after your visit        Who to contact     If you have questions or need follow up information about today's clinic visit or your schedule please contact [facility] directly at [phone].  Normal or non-critical lab and  "imaging results will be communicated to you by MyChart, letter or phone within 4 business days after the clinic has received the results. If you do not hear from us within 7 days, please contact the clinic through ki workt or phone. If you have a critical or abnormal lab result, we will notify you by phone as soon as possible.  Submit refill requests through [employer] or call your pharmacy and they will forward the refill request to us. Please allow 3 business days for your refill to be completed.          Additional Information About Your Visit        TagCashhar[employer]ofy Information     [employer] lets you send messages to your doctor, view your test results, renew your prescriptions, schedule appointments and more. To sign up, go to [email or web address] . Click on \"Log in\" on the left side of the screen, which will take you to the Welcome page. Then click on \"Sign up Now\" on the right side of the page.     You will be asked to enter the access code listed below, as well as some personal information. Please follow the directions to create your username and password.     Your access code is: [email or web address]  Expires: 2018 11:19 AM     Your access code will  in 90 days. If you need help or a new code, please call your [place] clinic or [phone].        Care EveryWhere ID     This is your Care EveryWhere ID. This could be used by other organizations to access your [place] medical records  [id number]         Blood Pressure from Last 3 Encounters:   17 125/75   17 138/74    Weight from Last 3 Encounters:   17 105.4 kg (232 lb 6.4 oz)   17 106.5 kg (234 lb 12.8 oz)              We Performed the Following     CHIROPRAC MANIP,SPINAL,1-2 REGIONS     ELECTRIC STIMULATION THERAPY        Primary Care Provider Office Phone # Fax #    Alexis Forte [phone] [address] [facility] [address]        Equal Access to Services     ALLYSON VALLE AH: Ketan de la cruz " Nabor, tia pritchard, luis m elbaluis e weiner, mika sincerein hayaan michellehannah melissashu laAyahcatrina elgin. So [facility] [phone].    ATENCIÓN: Si habla español, tiene a hooks disposición servicios gratuitos de asistencia lingüística. Mavis al [phone].    We comply with applicable federal civil rights laws and [place] laws. We do not discriminate on the basis of race, color, national origin, age, disability, sex, sexual orientation, or gender identity.            Thank you!     Thank you for choosing [facility] AND [facility]  for your care. Our goal is always to provide you with excellent care. Hearing back from our patients is one way we can continue to improve our services. Please take a few minutes to complete the written survey that you may receive in the mail after your visit with us. Thank you!             Your Updated Medication List - Protect others around you: Learn how to safely use, store and throw away your medicines at www.disposemymeds.org.      Notice  As of 2/20/2018 11:19 AM    You have not been prescribed any medications.

## 2018-02-20 NOTE — PROGRESS NOTES
PATIENT:  Alfie Otoole is a 57 y.o. male    PROBLEM:  Date of Initial Visit for this Episode:  12/7/2017 2/20/2018  Visit #3/5-8    SUBJECTIVE: Mid back pain had improved after two visits and low back not catching after single visit.      Gradually returning symptoms affecting cross country skiing.   R low back pain felt more weightbearing on R leg skiing.  Less frequently stretching.     Frequent aching and stiffness.  Radiation of pain: no  Pain rated 2/10, 4/10 at it's worst.      ROS:  A comprehensive review of systems was negative.    2/20/2018 Neck index 4% Back index 20%    12/7/2017  Neck index n/a     Back index 20%    Functional limitations: skiing    OBJECTIVE:    DIAGNOSTICS:  Left mild upper thoracic convexity with moderate mid to lower multilevel thoracic degenerative disc changes and osteophytes.  There is mild reverse S shaped scoliosis. Vertebral body heights are maintained. Disc spaces are mildly narrowed with degenerative endplate changes and osteophytes. No     2/20/2018   Gait: unremarkable.   Posture: Mild to moderate Anterior head carriage, right rounded shoulders.  Level iliac crest, Low right shoulder, Low left with left rotation occiput.      Cervical Not assessed today. No complaint.    Thoracic ROM:  restricted   Lumbar ROM:   restricted     +Gillets: +ISAAK, PALAK     +Leg length inequality  +Tenderness:  rhomboids, R thoracic paraspinals, R lumbar  +Muscle spasm:  Moderate  trapezius, pectorals, thoracic paraspinals, moderate right greater than left lumbar paraspinals.   +Joint asymmetry and restriction:  T5 left, T9 R, ISAAK, PALAK.      ASSESSMENT: Alfie Otoole is a 57 y.o. male  with a flare of  Thoracic and low back pain related to degenerative changes.       1. Right-sided thoracic back pain    2. Segmental and somatic dysfunction of thoracic region    3. Other intervertebral disc degeneration, thoracic region    4. Acute right-sided low back pain without sciatica    5. Somatic  dysfunction of spine, sacral        PLAN    Care:   Spinal Adjustments to noted levels using Diversified prone thoracic, side posture pelvic and drop sacrum.   Electronic muscle stimulation,interferential 4 pads at  Hz premod to tolerance for 15 min. to thoracic.    Stretching: shown seated forward fold, discussed inversion positions vs. Table.  Patient Response:  Improved muscle spasm, pain and tenderness    2/20/2018 Plan of Care:  Short term 3-5 visits of Chiropractic Care including Spinal Adjustments and/or physiotherapy and active rehabilitation, to include exercises in the office and/or at home to meet care plan goals.     Frequency: 2xweek for up to 2-3 weeks   POC discussed.    2/20/2018 Goals:     Patient will report improved pain.   Patient will demonstrate an improved ability to complete Activities of Daily Living  as shown by a reported reduced score on neck and back index.    Patient will demonstrate improved ROM and motion on palpation testing to be able to turn to check traffic driving better.

## 2018-02-28 ENCOUNTER — DOCUMENTATION ONLY (OUTPATIENT)
Dept: FAMILY MEDICINE | Facility: OTHER | Age: 58
End: 2018-02-28

## 2018-05-03 ENCOUNTER — THERAPY VISIT (OUTPATIENT)
Dept: CHIROPRACTIC MEDICINE | Facility: OTHER | Age: 58
End: 2018-05-03
Attending: CHIROPRACTOR
Payer: COMMERCIAL

## 2018-05-03 DIAGNOSIS — M99.02 SEGMENTAL AND SOMATIC DYSFUNCTION OF THORACIC REGION: Primary | ICD-10-CM

## 2018-05-03 DIAGNOSIS — M51.34 OTHER INTERVERTEBRAL DISC DEGENERATION, THORACIC REGION: ICD-10-CM

## 2018-05-03 DIAGNOSIS — M54.6 ACUTE RIGHT-SIDED THORACIC BACK PAIN: ICD-10-CM

## 2018-05-03 PROCEDURE — 97014 ELECTRIC STIMULATION THERAPY: CPT | Performed by: CHIROPRACTOR

## 2018-05-03 PROCEDURE — 98940 CHIROPRACT MANJ 1-2 REGIONS: CPT | Performed by: CHIROPRACTOR

## 2018-05-03 NOTE — MR AVS SNAPSHOT
After Visit Summary   5/3/2018    Alfie Otoole    MRN: [id number]           Patient Information     Date Of Birth          1960        Visit Information        Provider Department      5/3/2018 9:00 AM Nunu Hernandez DC [email or web address]        Today's Diagnoses     Segmental and somatic dysfunction of thoracic region    -  1    Acute right-sided thoracic back pain        Other intervertebral disc degeneration, thoracic region          Care Instructions    Recommend Stretching as previously shown.    5/3/2018 Plan of Care:  Short term 3-5 visits of Chiropractic Care including Spinal Adjustments and/or physiotherapy and active rehabilitation, to include exercises in the office and/or at home to meet care plan goals.     Frequency: 2xweek for up to 2-3 weeks   POC discussed.    5/3/2018 Goals:     Patient will report improved sleep.   Patient will demonstrate an improved ability to complete Activities of Daily Living as shown by a reported reduced score on neck and/or back index. 5/3/2018 Back index 10%   Patient will demonstrate improved ROM.            Follow-ups after your visit        Follow-up notes from your care team     Return in about 4 days (around 5/7/2018), or if symptoms worsen or fail to improve.      Who to contact     If you have questions or need follow up information about today's clinic visit or your schedule please contact [facility] directly at [phone].  Normal or non-critical lab and imaging results will be communicated to you by MyChart, letter or phone within 4 business days after the clinic has received the results. If you do not hear from us within 7 days, please contact the clinic through MyChart or phone. If you have a critical or abnormal lab result, we will notify you by phone as soon as possible.  Submit refill requests through [employer] or call your pharmacy and they will forward the refill request to us. Please allow 3  "business days for your refill to be completed.          Additional Information About Your Visit        MyChart Information     [employer] lets you send messages to your doctor, view your test results, renew your prescriptions, schedule appointments and more. To sign up, go to [email or web address] . Click on \"Log in\" on the left side of the screen, which will take you to the Welcome page. Then click on \"Sign up Now\" on the right side of the page.     You will be asked to enter the access code listed below, as well as some personal information. Please follow the directions to create your username and password.     Your access code is: [email or web address]  Expires: 2018 12:19 PM     Your access code will  in 90 days. If you need help or a new code, please call your [place] clinic or [phone].        Care EveryWhere ID     This is your Care EveryWhere ID. This could be used by other organizations to access your [place] medical records  [id number]         Blood Pressure from Last 3 Encounters:   17 125/75   17 138/74    Weight from Last 3 Encounters:   17 105.4 kg (232 lb 6.4 oz)   17 106.5 kg (234 lb 12.8 oz)              We Performed the Following     C CHIROPRAC MANIP,SPINAL,1-2 REGIONS - GICH ONLY     ELECTRIC STIMULATION THERAPY        Primary Care Provider Office Phone # Fax #    Alexis Forte [phone] [address] [facility] [address]        Equal Access to Services     MEHRAN VALLE : Hadii brenda hernandezo Sosoy, waaxda luqadaha, qaybta kaalmada regine, mika eisenberg. So [facility] [phone].    ATENCIÓN: Si habla español, tiene a hooks disposición servicios gratuitos de asistencia lingüística. Llame al [phone].    We comply with applicable federal civil rights laws and [place] laws. We do not discriminate on the basis of race, color, national origin, age, disability, sex, sexual orientation, or gender " identity.            Thank you!     Thank you for choosing [facility]  for your care. Our goal is always to provide you with excellent care. Hearing back from our patients is one way we can continue to improve our services. Please take a few minutes to complete the written survey that you may receive in the mail after your visit with us. Thank you!             Your Updated Medication List - Protect others around you: Learn how to safely use, store and throw away your medicines at www.disposemymeds.org.      Notice  As of 5/3/2018 11:16 AM    You have not been prescribed any medications.

## 2018-05-03 NOTE — PROGRESS NOTES
"PATIENT:  Alfie Otoole is a 58 year old male    PROBLEM:  Date of Initial Visit for this Episode: 5/3/2018 ;  Last episode 12/7/2017- 2/20/2018 3 Visits  5/3/2018  #1    SUBJECTIVE: Right Mid back aching pain slowly returning, rated 2-4/10 along thoracic spine and to right.  Affecting sleep.  Has been roller skiing classic style which required frequent upper body pulling.   After last visit R midback spasm more laterally lasted longer than a day or two.   No low back pain.  Has not been doing recommended stretching.         ROS:  +Musculoskelteal    5/3/2018    Neck index n/a Back index 10%    Functional limitations: sleep    2/20/2018  Neck index 4% Back index 16%    12/7/2017  Neck index n/a     Back index 20%        OBJECTIVE:    DIAGNOSTICS:  Left mild upper thoracic convexity with moderate mid to lower multilevel thoracic degenerative disc changes and osteophytes.  There is mild reverse S shaped scoliosis. Vertebral body heights are maintained. Disc spaces are mildly narrowed with degenerative endplate changes and osteophytes. No     5/3/2018   Gait: unremarkable.   Posture: Mild to moderate Anterior head carriage, right rounded shoulders.  Level iliac crest,  shoulder, occiput.      Cervical Not assessed today. No complaint.    Thoracic ROM:  Full  Lumbar ROM:   Full    +Tenderness: R T5-6   +Muscle spasm:  left trapezius, rhomboids, R thoracic paraspinals, R lat dorsi  +Joint asymmetry and restriction:  T2 L, T3 R, T6 R      ASSESSMENT:  Thoracic back pain related to degenerative changes.     1. Segmental and somatic dysfunction of thoracic region    2. Acute right-sided thoracic back pain    3. Other intervertebral disc degeneration, thoracic region        PLAN    Care:   Electronic muscle stimulation,interferential 4 pads at  Hz premod to tolerance for 15 min. to thoracic.   Spinal Adjustments to noted levels using Diversified prone thoracic, Flexion/Distraction 5\".     Recommend Stretching as " "previously shown.  Patient Response: feels good, \"always takes a little while to determine response\".    5/3/2018 Plan of Care:  Short term 3-5 visits of Chiropractic Care including Spinal Adjustments and/or physiotherapy and active rehabilitation, to include exercises in the office and/or at home to meet care plan goals.     Frequency: 2xweek for up to 2-3 weeks   POC discussed.    5/3/2018 Goals:     Patient will report improved sleep.   Patient will demonstrate an improved ability to complete Activities of Daily Living as shown by a reported reduced score on neck and/or back index. 5/3/2018 Back index 10%   Patient will demonstrate improved ROM.      "

## 2018-05-03 NOTE — PATIENT INSTRUCTIONS
Recommend Stretching as previously shown.    5/3/2018 Plan of Care:  Short term 3-5 visits of Chiropractic Care including Spinal Adjustments and/or physiotherapy and active rehabilitation, to include exercises in the office and/or at home to meet care plan goals.     Frequency: 2xweek for up to 2-3 weeks   POC discussed.    5/3/2018 Goals:     Patient will report improved sleep.   Patient will demonstrate an improved ability to complete Activities of Daily Living as shown by a reported reduced score on neck and/or back index. 5/3/2018 Back index 10%   Patient will demonstrate improved ROM.

## 2019-06-06 ENCOUNTER — THERAPY VISIT (OUTPATIENT)
Dept: CHIROPRACTIC MEDICINE | Facility: OTHER | Age: 59
End: 2019-06-06
Attending: CHIROPRACTOR
Payer: COMMERCIAL

## 2019-06-06 DIAGNOSIS — G89.29 CHRONIC RIGHT-SIDED THORACIC BACK PAIN: ICD-10-CM

## 2019-06-06 DIAGNOSIS — M54.2 CERVICALGIA: Primary | ICD-10-CM

## 2019-06-06 DIAGNOSIS — M99.01 SEGMENTAL AND SOMATIC DYSFUNCTION OF CERVICAL REGION: ICD-10-CM

## 2019-06-06 DIAGNOSIS — M51.34 OTHER INTERVERTEBRAL DISC DEGENERATION, THORACIC REGION: ICD-10-CM

## 2019-06-06 DIAGNOSIS — M25.512 LEFT SHOULDER PAIN: ICD-10-CM

## 2019-06-06 DIAGNOSIS — M99.02 SEGMENTAL AND SOMATIC DYSFUNCTION OF THORACIC REGION: ICD-10-CM

## 2019-06-06 DIAGNOSIS — M54.6 CHRONIC RIGHT-SIDED THORACIC BACK PAIN: ICD-10-CM

## 2019-06-06 PROCEDURE — 98940 CHIROPRACT MANJ 1-2 REGIONS: CPT | Mod: AT | Performed by: CHIROPRACTOR

## 2019-06-06 PROCEDURE — 99212 OFFICE O/P EST SF 10 MIN: CPT | Mod: 25 | Performed by: CHIROPRACTOR

## 2019-06-06 NOTE — PROGRESS NOTES
"  6/6/2019   Visit #: 1    Subjective:  Alfie Otoole is a 59 year old male who is seen in f/u up for:        Cervicalgia  Segmental and somatic dysfunction of cervical region  Left shoulder pain  Segmental and somatic dysfunction of thoracic region  Chronic right-sided thoracic back pain  Other intervertebral disc degeneration, thoracic region.     Since last visit on Visit date not found,  Alfie Otoole reports:  Neck pain x months slow onset, occasional 26 to 50% of the time occurrence and interfering a little bit with daily activities. R>L lower neck stiff/achey.  Limited turning right. No injury but feels \"unbalanced\" with pulling motion at end of winter cross country skiing season.     MIld R midthoracic pain in \"spot\" felt with rolling over in bed occasionally. From prior xrays taken in 2017, there is a mild reverse S shaped scoliosis and multilevel degenerative disease.    Plans to see orthopedics for chronic left shoulder pain, but wanted assessment here first.  Left focal anterior shoulder pain, increasing frequency of dull pain that extends into deltoid.   Recent heavier pounding and digging activities.  Worried it will limit his recreation and cross country skiing.   Trying to stretch through it, but not as consistent with side lying shoulder circles.   Prior left tennis elbow.      Area of chief complaint:  Cervical :  Symptoms are graded at 1-2/10. The quality is described as stiff, achey, dull.  Motion has decreased turning to the right.       Neck Disability Index (  Cullen H. and Bony C. 1991. All rights reserved.; used with permission) 6/6/2019   SECTION 1 - PAIN INTENSITY 1   SECTION 2 - PERSONAL CARE 0   SECTION 3 - LIFTING 1   SECTION 4 - READING 0   SECTION 5 - HEADACHES 0   SECTION 6 - CONCENTRATION 0   SECTION 7 - WORK 0   SECTION 8 - DRIVING 1   SECTION 9 - SLEEPING 1   SECTION 10 - RECREATION 1   Count 10   Sum 5   Raw Score: /50 5   Neck Disability Index Score: (%) 10       Objective: " "   Neuro: grossly intact, UE Reflexes and strength not assessed today.    +musculoskeletal  The following was observed:    P: pain elicited on palpation, mild over L acromioclavicular and posterior shoulder capsule Traps R>>L    A: Forward head, rounded shoulders.   static palpation demonstrates intersegmental asymmetry , cervical, thoracic    R: motion palpation notes restricted motion, C1 , C5 , T3  and T7    Cervical AROM Restricted lateral bending bilateral, right rotation    T: hypertonicity at: Rhomboids, Sub-occipital, T-spine paraspinal, Traps and Taut and tender forearm wrist extensors and flexors.:  R>>L and midthoracic paraspinals bilaterally    S: Cervical orthopedic tests:   Maximal foraminal compression negative to the left; to right + right upper back pain  Shoulder depression negative on left and right, producing \"stretch \".     Shoulders both with essentially full AROM in extension and abduction with mild left shoulder pain.  With left shoulder ER felt pulling/pain back of arm (tricep).  Left shoulder + apprehension, negative Hawkin Francisco,  negative impingement.     Negative elbow and shoulder joint dysfunction to address with manual adjustments today.     Segmental spinal dysfunction/restrictions found at:  C1 , C5 , T3  and T7       Assessment:     Diagnoses:      1. Cervicalgia    2. Segmental and somatic dysfunction of cervical region    3. Left shoulder pain    4. Segmental and somatic dysfunction of thoracic region    5. Chronic right-sided thoracic back pain    6. Other intervertebral disc degeneration, thoracic region        Patient's condition:  Patient had restrictions pre-manipulation    Treatment effectiveness:  Post manipulation there is better intersegmental movement and Patient claims to feel looser post manipulation      Procedures:  CMT:  [id number] Chiropractic manipulative treatment 1-2 regions performed   Cervical: Diversified, C1 , C5 , Supine  Thoracic: Diversified, T3, T7, " Prone    Modalities:  None performed this visit    Therapeutic procedures:  5 minutes shown stretching of forearm flexors and extensors to do bilateral.  Encouraged ice, discussed internal and external light range of motion stretching exercises.  Encouraged resume daily side-lying shoulder circles and thoracic rotation stretching.   Gave patient Ice instructions post adjustment, and instructions for acute care    Response to Treatment  Reduction in symptoms as reported by patient    Prognosis: Good    Progress towards Goals: Patient is making progress towards the goal.     Recommendations:    Instructions:  ice 20 minutes every other hour as needed, heat 15 minutes every other hour as needed and stretch as instructed at visit    Follow-up:    Return to care if symptoms persist for cervical and thoracic complaints.  If not able to calm down in 2-3 more visits focused on cervical, thoracic spine adjusting and upper extremities self-care, recommend orthopedic evaluation and consider physical therapy for left shoulder and arm.

## 2019-08-26 DIAGNOSIS — Z13.220 ENCOUNTER FOR SCREENING FOR LIPOID DISORDERS: ICD-10-CM

## 2019-08-26 DIAGNOSIS — Z12.5 SCREENING FOR PROSTATE CANCER: ICD-10-CM

## 2019-08-26 DIAGNOSIS — M25.512 CHRONIC LEFT SHOULDER PAIN: Primary | ICD-10-CM

## 2019-08-26 DIAGNOSIS — G89.29 CHRONIC LEFT SHOULDER PAIN: Primary | ICD-10-CM

## 2019-08-26 NOTE — PROGRESS NOTES
Patient is requesting lab work.  He also reports left shoulder pain that is chronic.  Is been going on for 6 months.  Was wondering about spurring.  Plan we will proceed with x-rays and lab work.  Get back to patient when they return.

## 2019-08-27 ENCOUNTER — HOSPITAL ENCOUNTER (OUTPATIENT)
Dept: GENERAL RADIOLOGY | Facility: OTHER | Age: 59
Discharge: HOME | End: 2019-08-27
Attending: FAMILY MEDICINE | Admitting: FAMILY MEDICINE
Payer: COMMERCIAL

## 2019-08-27 DIAGNOSIS — Z13.220 ENCOUNTER FOR SCREENING FOR LIPOID DISORDERS: ICD-10-CM

## 2019-08-27 DIAGNOSIS — M25.512 CHRONIC LEFT SHOULDER PAIN: ICD-10-CM

## 2019-08-27 DIAGNOSIS — Z12.5 SCREENING FOR PROSTATE CANCER: ICD-10-CM

## 2019-08-27 DIAGNOSIS — G89.29 CHRONIC LEFT SHOULDER PAIN: ICD-10-CM

## 2019-08-27 LAB
CHOLEST SERPL-MCNC: 199 MG/DL
HDLC SERPL-MCNC: 44 MG/DL (ref 23–92)
LDLC SERPL CALC-MCNC: 125 MG/DL
NONHDLC SERPL-MCNC: 155 MG/DL
PSA SERPL-ACNC: 3.02 NG/ML
TRIGL SERPL-MCNC: 150 MG/DL

## 2019-08-27 PROCEDURE — 36415 COLL VENOUS BLD VENIPUNCTURE: CPT | Performed by: FAMILY MEDICINE

## 2019-08-27 PROCEDURE — 73030 X-RAY EXAM OF SHOULDER: CPT | Mod: LT

## 2019-08-27 PROCEDURE — G0103 PSA SCREENING: HCPCS | Performed by: FAMILY MEDICINE

## 2019-08-27 PROCEDURE — 80061 LIPID PANEL: CPT | Performed by: FAMILY MEDICINE

## 2019-08-29 ENCOUNTER — TELEPHONE (OUTPATIENT)
Dept: FAMILY MEDICINE | Facility: OTHER | Age: 59
End: 2019-08-29

## 2019-08-29 DIAGNOSIS — M25.512 CHRONIC LEFT SHOULDER PAIN: Primary | ICD-10-CM

## 2019-08-29 DIAGNOSIS — Z12.5 SCREENING FOR PROSTATE CANCER: ICD-10-CM

## 2019-08-29 DIAGNOSIS — G89.29 CHRONIC LEFT SHOULDER PAIN: Primary | ICD-10-CM

## 2019-09-16 ENCOUNTER — TELEPHONE (OUTPATIENT)
Dept: UROLOGY | Facility: OTHER | Age: 59
End: 2019-09-16

## 2019-09-16 NOTE — TELEPHONE ENCOUNTER
Patient called to cancel his appointment on 09/18/19 with Dr Phelan. Appointment has been canceled. Patient has question as to whether it is necessary to come in. He feels the PSA  is not that high. Please call patient.    Marianela Rodríguez on 9/16/2019 at 1:20 PM

## 2019-09-16 NOTE — TELEPHONE ENCOUNTER
After name and date of birth verified - patient was unsure if he should see Davy Phelan MD.  Advised patient it is truly his decision but Dr Forte did put in a referral for him to be seen.  Patient scheduled 9/26/19

## 2019-09-25 ENCOUNTER — HOSPITAL ENCOUNTER (OUTPATIENT)
Dept: PHYSICAL THERAPY | Facility: OTHER | Age: 59
Setting detail: THERAPIES SERIES
End: 2019-09-25
Attending: FAMILY MEDICINE
Payer: COMMERCIAL

## 2019-09-25 DIAGNOSIS — M25.512 CHRONIC LEFT SHOULDER PAIN: ICD-10-CM

## 2019-09-25 DIAGNOSIS — G89.29 CHRONIC LEFT SHOULDER PAIN: ICD-10-CM

## 2019-09-25 PROCEDURE — 97140 MANUAL THERAPY 1/> REGIONS: CPT | Mod: GP | Performed by: PHYSICAL THERAPIST

## 2019-09-25 PROCEDURE — 97110 THERAPEUTIC EXERCISES: CPT | Mod: GP | Performed by: PHYSICAL THERAPIST

## 2019-09-25 PROCEDURE — 97161 PT EVAL LOW COMPLEX 20 MIN: CPT | Mod: GP | Performed by: PHYSICAL THERAPIST

## 2019-09-25 NOTE — PROGRESS NOTES
09/25/19 0800   General Information   Type of Visit Initial OP Ortho PT Evaluation   Start of Care Date 09/25/19   Referring Physician Dr Forte   Patient/Family Goals Statement reduce L shoulder pain   Orders Evaluate and Treat   Date of Order 08/29/19   Certification Required? No   Medical Diagnosis Chronic left shoulder pain M25.512, G89.29    Surgical/Medical history reviewed Yes   Precautions/Limitations no known precautions/limitations   Weight-Bearing Status - LUE full weight-bearing   Weight-Bearing Status - RUE full weight-bearing   General Information Comments please refer to pt's medical record for any additional information   Body Part(s)   Body Part(s) Shoulder   Presentation and Etiology   Pertinent history of current problem (include personal factors and/or comorbidities that impact the POC) For the past 6 months his shoulder has slowly been getting worse, no specific CARINA. Reaching to the side (abduction) and behind the back (IR) seem to cause him the most discomfort. ROM is limited these directions due to pain. He is very active and will be cross country skiing this winter   Impairments A. Pain;D. Decreased ROM;E. Decreased flexibility;F. Decreased strength and endurance   Functional Limitations perform activities of daily living;perform desired leisure / sports activities   Symptom Location L shoulder region   How/Where did it occur From insidious onset   Onset date of current episode/exacerbation 03/25/19   Chronicity Chronic   Pain rating (0-10 point scale) Best (/10);Worst (/10)   Best (/10) 0   Worst (/10) 8   Pain quality A. Sharp;C. Aching   Frequency of pain/symptoms B. Intermittent   Pain/symptoms exacerbated by C. Lifting;G. Certain positions;H. Overhead reach;J. ADL;K. Home tasks;L. Work tasks   Pain/symptoms eased by E. Changing positions;F. Certain positions;C. Rest;K. Other   Pain eased by comment avoiding aggravating activities   Progression of symptoms since onset: Unchanged    Current / Previous Interventions   Diagnostic Tests: X-ray   X-ray Results Results   X-ray results FINDINGS: No acute fracture or dislocation is seen. Acromiohumeral  (info copied from pt's chart, see chart for details)   Prior Level of Function   Prior Level of Function-Mobility Ind   Prior Level of Function-ADLs Ind   Current Level of Function   Current Community Support Family/friend caregiver   Patient role/employment history A. 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 No   Have you fallen and had an injury in the past year? No   Is patient a fall risk? No   Abuse Screen (yes response referral indicated)   Feels Unsafe at Home or Work/School no   Feels Threatened by Someone no   Does Anyone Try to Keep You From Having Contact with Others or Doing Things Outside Your Home? no   Physical Signs of Abuse Present no   Functional Scales   Functional Scales Other   Other Scales  PSFS   Shoulder Objective Findings   Observation scap dyskinesis favoring L side. Decreased muscle tone with scap sets.   Posture forward head, rounded shoulders   Cervical Screen (ROM, quadrant) normal   Shoulder ROM Comment 90 AROM flexion, to waisline with IR behind the back. All other ROM normal   Neer's Test POS   Coronado-Francisco Test POS   Shoulder Special Tests Comments NO RC pathology. NO biceps pathology   Palpation tender along inferior and posterior borders of acromion   Planned Therapy Interventions   Planned Therapy Interventions joint mobilization;manual therapy;motor coordination training;neuromuscular re-education;strengthening;stretching;ROM   Planned Modality Interventions   Planned Modality Interventions Cryotherapy;Electrical stimulation;Ultrasound   Clinical Impression   Criteria for Skilled Therapeutic  Interventions Met yes, treatment indicated   PT Diagnosis L shoulder pain   Influenced by the following impairments decreased L shoulder ROM   Functional limitations due to impairments reaching overhead and behind back with L UE   Clinical Presentation Stable/Uncomplicated   Clinical Decision Making (Complexity) Low complexity   Therapy Frequency 2 times/Week   Predicted Duration of Therapy Intervention (days/wks) 8 weeks   Risk & Benefits of therapy have been explained Yes   Patient, Family & other staff in agreement with plan of care Yes   Clinical Impression Comments signs consistent with possible impingement   Education Assessment   Preferred Learning Style Listening;Reading;Demonstration;Pictures/video   Barriers to Learning No barriers   ORTHO GOALS   PT Ortho Eval Goals 1;2;3   Ortho Goal 1   Goal Identifier Pain   Goal Description Pt to report a max pain level of 2/10 throughout the day for improved ADLs and work duties   Target Date 11/20/19   Ortho Goal 2   Goal Identifier Sleep & quality of sleep   Goal Description Pt to report improved sleep quality at night for improved wellness   Target Date 11/20/19   Ortho Goal 3   Goal Identifier ROM   Goal Description Pt to demo L shoulder ROM that is comparable to R with all planes for improved ADLs   Target Date 11/20/19   Total Evaluation Time   PT Queenie Low Complexity Minutes ([id number]) 15

## 2019-09-26 ENCOUNTER — OFFICE VISIT (OUTPATIENT)
Dept: UROLOGY | Facility: OTHER | Age: 59
End: 2019-09-26
Attending: UROLOGY
Payer: COMMERCIAL

## 2019-09-26 VITALS — HEART RATE: 68 BPM | RESPIRATION RATE: 14 BRPM | SYSTOLIC BLOOD PRESSURE: 132 MMHG | DIASTOLIC BLOOD PRESSURE: 84 MMHG

## 2019-09-26 DIAGNOSIS — R97.20 ELEVATED PSA: Primary | ICD-10-CM

## 2019-09-26 PROCEDURE — 99203 OFFICE O/P NEW LOW 30 MIN: CPT | Performed by: UROLOGY

## 2019-09-26 ASSESSMENT — PAIN SCALES - GENERAL: PAINLEVEL: NO PAIN (0)

## 2019-09-26 NOTE — NURSING NOTE
Pt presents to clinic for consult for elevated PSA    Review of Systems:    Weight loss:    No     Recent fever/chills:  No   Night sweats:   No  Current skin rash:  No   Recent hair loss:  No  Heat intolerance:  No   Cold intolerance:  No  Chest pain:   No   Palpitations:   No  Shortness of breath:  No   Wheezing:   No  Constipation:    No   Diarrhea:   No   Nausea:   No   Vomiting:   No   Kidney/side pain:  No   Back pain:   No  Frequent headaches:  No   Dizziness:     No  Leg swelling:   No   Calf pain:    No    Parents, brothers or sisters with history of kidney cancer:   No  Parents, brothers or sisters with history of bladder cancer: No  Father or brother with history of prostate cancer:  No

## 2019-09-26 NOTE — PROGRESS NOTES
I was asked to see this patient by Dr Forte and provide my opinion about the following:  Elevated PSA    Type of Visit  Consult    Chief Complaint  Elevated PSA    HPI  Mr. Otoole is a 59 year old male who presents with an elevated PSA.  He does not have a family history of prostate cancer.  The patient has not previously undergone prostate biopsy.  No associated worsening LUTS, dysuria or prostatitis at the time of the PSA.  The most recent PSA was collected 1 month ago.      Past Medical History  He  has a past medical history of Dorsalgia.  Patient Active Problem List   Diagnosis     Constipation     Esophageal reflux       Past Surgical History  He  has a past surgical history that includes Endoscopy upper, colonoscopy, combined (06/25/2012).    Medications  He currently has no medications in their medication list.    Allergies  No Known Allergies    Social History  He  reports that he has never smoked. He has never used smokeless tobacco. He reports current alcohol use.  No drug abuse.    Family History  Family History   Problem Relation Age of Onset     Heart Disease Father         Heart Disease,CHF     Breast Cancer Mother         Cancer-breast     Heart Disease Mother         Heart Disease,afib     Prostate Cancer Maternal Grandfather         Cancer-prostate     Coronary Artery Disease Maternal Uncle         Coronary artery disease,smoker       Review of Systems  I personally reviewed the ROS with the patient.    There are no exam notes on file for this visit.    Physical Exam  There were no vitals filed for this visit.  Constitutional: No acute distress.  Alert and cooperative   Head: NCAT  Eyes: Conjunctivae normal  Cardiovascular: Regular rate.  Pulmonary/Chest: Respirations are even and non-labored bilaterally, no audible wheezing  Abdominal: Soft. No distension, tenderness, masses or guarding.   Neurological: A + O x 3.  Cranial Nerves II-XII grossly intact.  Extremities: NATANAEL x 4, Warm. No clubbing.  No  cyanosis.    Skin: Pink, warm and dry.  No visible rashes noted.  Psychiatric:  Normal mood and affect  Back:  No left CVA tenderness.  No right CVA tenderness.  Genitourinary:  Nonpalpable bladder  SCOTT: 20-30g smooth, symmetric, no nodules, no induration    Labs  Results for DIAMANTE OLVERA (MRN [id number]) as of 9/26/2019 09:00   11/21/2017 12:12 8/27/2019 09:15   PSA 2.788 3.019       Assessment  Mr. Olvera is a 59 year old male who presents with elevated PSA.    Discussed the risks of biopsy leading to overdiagnosis and overtreatment.    We discussed options regarding the elevated PSA including continuing to check serial levels, Select MDx urine testing to further stratify his personal risk level, MRI of the prostate, prostate ultrasound.    We discussed the risks of biopsy possibly leading to over diagnosis and over treatment.    I explained to the patient that an elevated PSA is a marker of risk of prostate cancer and a prostate biopsy would be the next step in diagnosis.  I explained that sampling error can occur with any biopsy and there is a risk of potentially missing a cancer that may be present.    3% Risk of high grade cancer detected on biopsy  17% Risk of low grade cancer detected on biopsy  81% Chance of benign biopsy    I discussed the risks, benefits, and alternatives to prostate biopsy, including hematuria, hematochezia, and hematospermia.  I also discussed the risk of diagnosing a clinically-insignificant prostate cancer.  I discussed the risks of sepsis, which can be minimized by prophylactic antibiotics.     Plan  Follow-up PSA in 1 year

## 2019-10-02 ENCOUNTER — HOSPITAL ENCOUNTER (OUTPATIENT)
Dept: PHYSICAL THERAPY | Facility: OTHER | Age: 59
Setting detail: THERAPIES SERIES
End: 2019-10-02
Attending: FAMILY MEDICINE
Payer: COMMERCIAL

## 2019-10-02 PROCEDURE — 97035 APP MDLTY 1+ULTRASOUND EA 15: CPT | Mod: GP | Performed by: PHYSICAL THERAPIST

## 2019-10-02 PROCEDURE — 97140 MANUAL THERAPY 1/> REGIONS: CPT | Mod: GP | Performed by: PHYSICAL THERAPIST

## 2019-10-02 PROCEDURE — 97110 THERAPEUTIC EXERCISES: CPT | Mod: GP | Performed by: PHYSICAL THERAPIST

## 2019-10-03 DIAGNOSIS — G89.29 CHRONIC LEFT SHOULDER PAIN: Primary | ICD-10-CM

## 2019-10-03 DIAGNOSIS — M25.512 CHRONIC LEFT SHOULDER PAIN: Primary | ICD-10-CM

## 2019-10-03 RX ORDER — CELECOXIB 200 MG/1
200 CAPSULE ORAL DAILY
Qty: 30 CAPSULE | Refills: 5 | Status: SHIPPED | OUTPATIENT
Start: 2019-10-03 | End: 2019-11-21

## 2019-10-09 ENCOUNTER — THERAPY VISIT (OUTPATIENT)
Dept: CHIROPRACTIC MEDICINE | Facility: OTHER | Age: 59
End: 2019-10-09
Attending: CHIROPRACTOR
Payer: COMMERCIAL

## 2019-10-09 ENCOUNTER — HOSPITAL ENCOUNTER (OUTPATIENT)
Dept: PHYSICAL THERAPY | Facility: OTHER | Age: 59
Setting detail: THERAPIES SERIES
End: 2019-10-09
Attending: FAMILY MEDICINE
Payer: COMMERCIAL

## 2019-10-09 DIAGNOSIS — M62.838 SPASM OF MUSCLE: ICD-10-CM

## 2019-10-09 DIAGNOSIS — M25.512 LEFT SHOULDER PAIN: Primary | ICD-10-CM

## 2019-10-09 PROCEDURE — 97810 ACUP 1/> WO ESTIM 1ST 15 MIN: CPT | Performed by: CHIROPRACTOR

## 2019-10-09 PROCEDURE — 97035 APP MDLTY 1+ULTRASOUND EA 15: CPT | Mod: GP | Performed by: PHYSICAL THERAPIST

## 2019-10-09 PROCEDURE — 99212 OFFICE O/P EST SF 10 MIN: CPT | Mod: 25 | Performed by: CHIROPRACTOR

## 2019-10-09 PROCEDURE — 97140 MANUAL THERAPY 1/> REGIONS: CPT | Mod: GP | Performed by: PHYSICAL THERAPIST

## 2019-10-09 NOTE — PATIENT INSTRUCTIONS
ice 20 minutes every other hour as needed, heat 15 minutes every other hour as needed and continue to follow direction by Physical therapy

## 2019-10-09 NOTE — PROGRESS NOTES
PATIENT:  Alfie Otoole is a 59 year old male presenting for left shoulder pain    PROBLEM:   Date of Initial Visit for this Episode:  10/9/2019     Visit #1    SUBJECTIVE / HPI: Patient presents with primary points of left shoulder pain.  Patient noted that symptoms began to progress slowly last winter.  Patient enjoys cross-country skiing and believes this to be a major contributing factor for onset of shoulder complaints.  Patient notes that he had been treating with Dr. Nunu Hernandez DC for neck and upper back symptoms.  Patient states that chiropractic care did help with neck and upper back pain symptoms however left shoulder continue to give him quite a bit of problems.  Patient notes specifically having difficulty with abduction and internal/external rotation motions.  Patient recently underwent x-ray evaluation of the shoulder.  These were available for my review prior to today's visit.  Patient is also since begun treatment with Vasu Freeman PT at [facility].  Due to the longevity of patient's symptoms as well as patient's eagerness to return to normal functioning status he wishes to undergo acupuncture therapy to help with ongoing symptoms.  Description and onset:  Duration and Frequency of Pain: Last winter and frequent  Radiation of pain: None  Pain rated at it's worst: 7/10  Pain rated currently:  3/10  Pain course: Gradually getting worse  Worse with: Horizontal AB duction, internal and external rotation.  Overhead motions  Improved by: Celebrex, physical therapy, chiropractic  Additional Features: popping and catching  Other Health Care Providers seen for this: Vasu Freeman PT, Nunu Hernandez D.C, Dr. Jann ROBERT  Previous treatment: Medication, chiropractic, physical therapy          See flowsheets in chart for details.  10/9/2019    Upper Extremity Functional Index (  1996 PW Marilyn) 10/9/2019   a.  Any of your usual work, housework or school activities 3-A Little bit of  Difficulty   b.  Your usual hobbies, recreational or sporting activities 3-A Little bit of Difficulty   c.  Lifting a bag of groceries to waist level 4-No Difficulty   d.  Placing an object onto, or removing it from an overhead shelf 2-Moderate Difficulty   e.  Washing your hair or scalp 3-A Little bit of Difficulty   f.   Pushing up on your hands (e.g., from bathtub or chair) 3-A Little bit of Difficulty   g.  Preparing food (e.g., peeling, cutting) 4-No Difficulty   h.  Driving 3-A Little bit of Difficulty   I.   Vacuuming, sweeping, or raking 3-A Little bit of Difficulty   j.   Dressing 3-A Little bit of Difficulty   k.  Doing up buttons 4-No Difficulty   l.   Using tools or appliances 4-No Difficulty   m. Opening doors 4-No Difficulty   n.  Cleaning 3-A Little bit of Difficulty   o.  Tying or lacing shoes 4-No Difficulty   p.  Sleeping 2-Moderate Difficulty   q.  Laundering clothes. (e.g., washing, ironing, folding) 3-A Little bit of Difficulty   r.   Opening a jar 4-No Difficulty   s.  Throwing a ball 1-Quite a bit of Difficulty   t.   Carrying a small suitcase with your affected limb  3-A Little bit of Difficulty   Column Totals: /80 63       Past D.C. Care: yes, helpful       Health History as reported by the patient: Excellent and Good      PAST MEDICAL HISTORY:  Past Medical History:   Diagnosis Date     Dorsalgia     Chronic back pain       PAST SURGICAL HISTORY:  Past Surgical History:   Procedure Laterality Date     ENDOSCOPY UPPER, COLONOSCOPY, COMBINED  06/25/2012    F/U 2022  EGD / Colonoscopy:  Normal exam       ALLERGIES:  No Known Allergies    CURRENT MEDICATIONS:  Current Outpatient Medications   Medication Sig Dispense Refill     celecoxib (CELEBREX) 200 MG capsule Take 1 capsule (200 mg) by mouth daily 30 capsule 5       SOCIAL HISTORY:  Marital Status: .  Children: yes.  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  Alcohol use:yes.  Tobacco use: Smoker: no.      FAMILY HISTORY:  Family  History   Problem Relation Age of Onset     Heart Disease Father         Heart Disease,CHF     Breast Cancer Mother         Cancer-breast     Heart Disease Mother         Heart Disease,afib     Prostate Cancer Maternal Grandfather         Cancer-prostate     Coronary Artery Disease Maternal Uncle         Coronary artery disease,smoker       Patient Active Problem List   Diagnosis     Constipation     Esophageal reflux         ROS:  The patient denies any fevers, chills, nausea, vomiting, diarrhea, constipation,dysuria, hematuria, or urinary hesitancy or incontinence.  No shortness of breath, chest pain, or rashes.    OBJECTIVE:    DIAGNOSTICS: Study Result     PROCEDURE: XR SHOULDER LT G/E 3 VW 8/27/2019 9:24 AM     HISTORY: Chronic left shoulder pain; Chronic left shoulder pain     COMPARISONS: None.     TECHNIQUE: 3 views.     FINDINGS: No acute fracture or dislocation is seen. Acromiohumeral  distance is fairly normal. No focal bone lesion is seen.                                                                        IMPRESSION: No acute abnormality.     MARIELY STEWART MD      Study Result     Procedure: XR SPINE THORACIC 2 VIEWS     HISTORY:  Thoracic facet joint syndrome. Midthoracic spine pain     TECHNIQUE: Thoracic spine 2 views.     COMPARISON: None.     FINDINGS:     There is mild reverse S shaped scoliosis. Vertebral body heights are maintained. Disc spaces are mildly narrowed with degenerative endplate changes and osteophytes. No acute fracture is seen.     IMPRESSION:  Mild scoliosis and multilevel degenerative disease.     Electronically Signed By: Sheron Franco M.D. on 11/21/2017 11:42 AM       PHYSICAL EXAM:     GENERAL APPEARANCE: healthy, alert, active, no distress and cooperative   GAIT: NORMAL      MUSCULOSKELETAL:   Posture: Mild to moderate anterior head carriage with rounding of shoulders bilaterally however left side is predominant.  Mild scapular winging noted left side.  This can be  the indication of underactive serratus anterior muscle.  No antalgic positioning, lateral listing, or other postural discrepancies other than those listed present at this time.  Gait:  unremarkable.     Cervical  ROM:  Not assessed        Thoracic and Lumbar  ROM:  Not assessed    Shoulder AROM performed, measured approximately; Patient does exhibit pain and moderate restriction with interal/external rotation of the left shoulder.  Shoulder flexion and horizontal abduction decreased to approximately 90 degrees    Per examination by Vasu Freeman PT  Shoulder Objective Findings   Observation scap dyskinesis favoring L side. Decreased muscle tone with scap sets.   Posture forward head, rounded shoulders   Cervical Screen (ROM, quadrant) normal   Shoulder ROM Comment 90 AROM flexion, to waisline with IR behind the back. All other ROM normal   Neer's Test POS   Coronado-Francisco Test POS   Shoulder Special Tests Comments NO RC pathology. NO biceps pathology   Palpation tender along inferior and posterior borders of acromion     Findings consistent with my examination.      Positive supraspinatus press test left side        +Tenderness:Present along the scalenes, upper trapezius, supraspinatus  +Muscle spasm: suboccipitals, trapezius, thoracic, lumbar paraspinals, glutes.   +Joint asymmetry and restriction: none present today    ASSESSMENT: Alfie Otoole is a 59 year old male ending with pain of the left shoulder.  Patient does appear to be a good candidate for acupuncture therapy along with possible chiropractic intervention for symptoms.  Acupuncture therapy meant to promote and enhance accuracy of the chiropractic adjustment.  No other contraindications present precluding patient from receiving acupuncture therapy on today's visit.  We did not find evidence to suggest segmental or somatic dysfunction of the thoracic or cervical spine on today's visit however I will continue to monitor and provide care accordingly.      1. Left shoulder pain    2. Spasm of muscle        PLAN    Evaluation and Management:  12693 Low to Moderate exam established patient 10 min    Procedures:  Modalities:  23662: Acupuncture, for 15 minutes:  Points: SI 9, 11, 13/ LI 15  For 15 minutes    CMT:  None performed      Therapeutic procedures:  None    Response to Treatment  No Change in symptoms, shoulder flexion does appear mildly improved. Patient was able to raise above 90 degrees post treatment    Prognosis: Good    10/9/2019 Plan of Care:  4 visits of Chiropractic Care including Spinal Adjustments and/or physiotherapy, acupuncture and active rehabilitation, to include exercises in the office and/or at home to meet care plan goals.     Frequency: 1xweek for up to 4 weeks. A reevaluation would be clinically appropriate in 4 visits, to determine progress and further course of care.    POC discussed and patient agreeable to plan of care.      10/9/2019 Goals:      Patient will report improved pain of the left shoulder.   Patient will report improved sleep without left shoulder pain interfering   Patient will demonstrate an improved ability to complete Activities of Daily Living  as shown by a reported 10-30% of the upper extremity function index   Patient will demonstrate improved ROM of the left shoulder.        INSTRUCTIONS   ice 20 minutes every other hour as needed, heat 15 minutes every other hour as needed and continue to follow direction by Physical therapy    Follow-up:  Return to care in 1 week.        Disclaimer: This note consists of symbols derived from keyboarding, dictation and/or voice recognition software. As a result, there may be errors in the script that have gone undetected. Please consider this when interpreting information found in this chart.

## 2019-10-14 ENCOUNTER — HOSPITAL ENCOUNTER (OUTPATIENT)
Dept: ULTRASOUND IMAGING | Facility: OTHER | Age: 59
End: 2019-10-14
Attending: RADIOLOGY

## 2019-10-14 DIAGNOSIS — I83.92 VARICOSE VEINS OF LEFT LOWER EXTREMITY, UNSPECIFIED WHETHER COMPLICATED: ICD-10-CM

## 2019-10-14 DIAGNOSIS — I83.91 VARICOSE VEINS OF RIGHT LOWER EXTREMITY, UNSPECIFIED WHETHER COMPLICATED: ICD-10-CM

## 2019-10-14 PROCEDURE — G0463 HOSPITAL OUTPT CLINIC VISIT: HCPCS

## 2019-10-16 ENCOUNTER — THERAPY VISIT (OUTPATIENT)
Dept: CHIROPRACTIC MEDICINE | Facility: OTHER | Age: 59
End: 2019-10-16
Attending: CHIROPRACTOR
Payer: COMMERCIAL

## 2019-10-16 ENCOUNTER — HOSPITAL ENCOUNTER (OUTPATIENT)
Dept: PHYSICAL THERAPY | Facility: OTHER | Age: 59
Setting detail: THERAPIES SERIES
End: 2019-10-16
Attending: FAMILY MEDICINE
Payer: COMMERCIAL

## 2019-10-16 DIAGNOSIS — M25.512 LEFT SHOULDER PAIN: Primary | ICD-10-CM

## 2019-10-16 DIAGNOSIS — M62.838 SPASM OF MUSCLE: ICD-10-CM

## 2019-10-16 PROCEDURE — 97810 ACUP 1/> WO ESTIM 1ST 15 MIN: CPT | Performed by: CHIROPRACTOR

## 2019-10-16 PROCEDURE — 97035 APP MDLTY 1+ULTRASOUND EA 15: CPT | Mod: GP | Performed by: PHYSICAL THERAPIST

## 2019-10-16 PROCEDURE — 97140 MANUAL THERAPY 1/> REGIONS: CPT | Mod: GP | Performed by: PHYSICAL THERAPIST

## 2019-10-16 PROCEDURE — 97110 THERAPEUTIC EXERCISES: CPT | Mod: GP | Performed by: PHYSICAL THERAPIST

## 2019-10-16 NOTE — PROGRESS NOTES
Visit #:  2    Subjective:  Alfie Otoole is a 59 year old male who is seen in f/u up for:        Left shoulder pain  Spasm of muscle.     Since last visit on 10/9/2019,  Alfie Otoole reports: Left shoulder continues to remain elevated grading pain levels between 1-7 out of 10.  Patient noted not much change following last treatment.  Patient continues to be treated with physical therapy in addition to acupuncture therapy.  Patient continues to have difficulty with abduction and internal rotation motions.           Objective:  The following was observed:    P: Not assessed  A: Not assessed  R: Not assessed  T: Taut and tender fibers are noted of the anterior attachments of the rotator cuff muscles primarily supra and infraspinatus as well as subscapularis.  Spasms also noted following anterior deltoid, left sided    Segmental spinal dysfunction/restrictions found at:  Not assessed      Assessment:Patient continues have have limited movement of the left shoulder and ongoing pain. On today's visit we did discuss how it is normal for impingement syndromes to respond slower to care. We have not performed chiropractic adjustment at this point in the patient's care. We did discuss this on today's visit as misalignment of the thoracic spine can cause shoulder pain manifestation. Patient requested that no chiropractic adjustment be provided as he does not feel any symptoms in his spine. I will follow patient's wishes.  I do believe that adjustment of the thoracic spine could provide additional relief of symptoms but the patient's wishes trump all else at this time.    As points of the posterior shoulder did not provide initial relief of symptoms I opted to utilized anterior/lateral shoulder points to address distal attachments of the rotator cuff as well as of the anterior deltoid.    We will follow up in 1 week for re-assessment of the patient. Should improvement be noticed we will continue with acupuncture therapy, if  no improvement noticed no further therapy will be pursued.    Diagnoses:      1. Left shoulder pain    2. Spasm of muscle        Patient's condition:  Symptoms are unchanged    Treatment effectiveness:  Patient noticed no changes      Procedures:  CMT:  None provided    Modalities:  92919: Acupuncture, for 15 minutes:  Points: SI 9, 10, 11, 12, 13, LI 15, TW 14  For 15 minutes    Therapeutic procedures:  None    Response to Treatment  No Change in symptoms     Prognosis: Guarded    Progress towards Goals: Patient has not made progress towards goal.     Recommendations:    Instructions:Continue to perform home exercises as directed from Jeremias Freeman PT    Follow-up:  Return to care in 1 week.

## 2019-10-23 ENCOUNTER — THERAPY VISIT (OUTPATIENT)
Dept: CHIROPRACTIC MEDICINE | Facility: OTHER | Age: 59
End: 2019-10-23
Attending: CHIROPRACTOR
Payer: COMMERCIAL

## 2019-10-23 ENCOUNTER — HOSPITAL ENCOUNTER (OUTPATIENT)
Dept: PHYSICAL THERAPY | Facility: OTHER | Age: 59
Setting detail: THERAPIES SERIES
End: 2019-10-23
Attending: FAMILY MEDICINE
Payer: COMMERCIAL

## 2019-10-23 DIAGNOSIS — M62.838 SPASM OF MUSCLE: ICD-10-CM

## 2019-10-23 DIAGNOSIS — M25.512 LEFT SHOULDER PAIN: Primary | ICD-10-CM

## 2019-10-23 PROCEDURE — 97810 ACUP 1/> WO ESTIM 1ST 15 MIN: CPT | Performed by: CHIROPRACTOR

## 2019-10-23 PROCEDURE — 97110 THERAPEUTIC EXERCISES: CPT | Mod: GP | Performed by: PHYSICAL THERAPIST

## 2019-10-23 PROCEDURE — 97140 MANUAL THERAPY 1/> REGIONS: CPT | Mod: GP | Performed by: PHYSICAL THERAPIST

## 2019-10-23 NOTE — PROGRESS NOTES
Visit #:  3    Subjective:  Alfie Otoole is a 59 year old male who is seen in f/u up for:        Left shoulder pain  Spasm of muscle.     Since last visit on 10/16/2019,  Alfie Otoole reports:    Area of chief complaint:Left shoulder appears relatively unchanged since last visit. Patient continues to have difficulty with horizontal abduction, internal rotation. Patient ranks shoulder pain between 0-7/10 depending on activities. Patient feels shoulder is somewhat less achey since beginning care with acupuncture and physical therapy but isn't completely sure.         Objective:  The following was observed:    P: Not assessed  A: None apparent  R: None apparent  T: Spasms are present of the left rhomboids, left trapezius, left levator scapula. Hypertonicity is noted of the left anterior deltoid    Segmental spinal dysfunction/restrictions found at:  None apparent      Assessment:Symptoms continue to be present and limiting. The patient and I did discuss possibility of undergoing evaluation by orthopedist as conservative measures do not seem to be helping symptoms. At patient's request no adjustments will be provided to the spine. Based on my chiropractic examination there are still copious amounts of soft tissue adhesions likely contributing to symptoms. Patient will be unavailable for follow up next week.  We will follow up with patient if he feels acupuncture has been helping with his symptoms.    Diagnoses:      1. Left shoulder pain    2. Spasm of muscle        Patient's condition:  Patient symptoms are staying the same    Treatment effectiveness:  Patient does not seem to be benefiting from acupuncture therapy      Procedures:  CMT:  None performed      Modalities:  66951: Acupuncture, for 15 minutes:  Points: SI9, 10, 11, 12, 13, 14/ TW 13, 14, P 2, LI  14, 15  For 15 minutes    Therapeutic procedures:  Encouraged stretching for latissimus dorsi, pectoralis major muscles    Response to Treatment  Reduction in  symptoms is mild. Patient could perform horizontal abduction with approximately 5 degrees improvement. Patient still unable to raise above 90 degrees.     Prognosis: Guarded    Progress towards Goals: Patient has not made progress towards goal.     Recommendations:    Instructions:stretch as instructed at visit    Follow-up:  Continue treatment PRN.

## 2019-10-31 NOTE — PATIENT INSTRUCTIONS
Instructions:  ice 20 minutes every other hour as needed, heat 15 minutes every other hour as needed and stretch as instructed at visit    Follow-up:    Return to care if symptoms persist for cervical and thoracic complaints.  If not able to calm down in 2-3 more visits focused on cervical, thoracic spine adjusting and upper extremities self-care, recommend orthopedic evaluation and consider physical therapy for left shoulder and arm.

## 2019-11-21 ENCOUNTER — OFFICE VISIT (OUTPATIENT)
Dept: FAMILY MEDICINE | Facility: OTHER | Age: 59
End: 2019-11-21
Attending: FAMILY MEDICINE
Payer: COMMERCIAL

## 2019-11-21 VITALS
RESPIRATION RATE: 16 BRPM | BODY MASS INDEX: 28.42 KG/M2 | SYSTOLIC BLOOD PRESSURE: 102 MMHG | HEART RATE: 60 BPM | DIASTOLIC BLOOD PRESSURE: 80 MMHG | TEMPERATURE: 98.6 F | WEIGHT: 227.4 LBS

## 2019-11-21 DIAGNOSIS — G89.29 CHRONIC LEFT SHOULDER PAIN: Primary | ICD-10-CM

## 2019-11-21 DIAGNOSIS — M25.512 CHRONIC LEFT SHOULDER PAIN: Primary | ICD-10-CM

## 2019-11-21 PROCEDURE — 99213 OFFICE O/P EST LOW 20 MIN: CPT | Performed by: FAMILY MEDICINE

## 2019-11-21 RX ORDER — CELECOXIB 200 MG/1
200 CAPSULE ORAL DAILY
Qty: 90 CAPSULE | Refills: 3 | Status: SHIPPED | OUTPATIENT
Start: 2019-11-21 | End: 2020-12-15

## 2019-11-21 ASSESSMENT — ANXIETY QUESTIONNAIRES
6. BECOMING EASILY ANNOYED OR IRRITABLE: NOT AT ALL
GAD7 TOTAL SCORE: 0
3. WORRYING TOO MUCH ABOUT DIFFERENT THINGS: NOT AT ALL
1. FEELING NERVOUS, ANXIOUS, OR ON EDGE: NOT AT ALL
5. BEING SO RESTLESS THAT IT IS HARD TO SIT STILL: NOT AT ALL
4. TROUBLE RELAXING: NOT AT ALL
2. NOT BEING ABLE TO STOP OR CONTROL WORRYING: NOT AT ALL
7. FEELING AFRAID AS IF SOMETHING AWFUL MIGHT HAPPEN: NOT AT ALL

## 2019-11-21 ASSESSMENT — PAIN SCALES - GENERAL: PAINLEVEL: MILD PAIN (2)

## 2019-11-21 ASSESSMENT — PATIENT HEALTH QUESTIONNAIRE - PHQ9: SUM OF ALL RESPONSES TO PHQ QUESTIONS 1-9: 0

## 2019-11-21 NOTE — NURSING NOTE
Patient presents to the clinic for left shoulder pain that has gotten worse in the last 8 months.  Medication Reconciliation Completed.    Pablo Perez LPN  11/21/2019 9:29 AM

## 2019-11-22 ASSESSMENT — ANXIETY QUESTIONNAIRES: GAD7 TOTAL SCORE: 0

## 2019-11-25 ENCOUNTER — HOSPITAL ENCOUNTER (OUTPATIENT)
Dept: MRI IMAGING | Facility: OTHER | Age: 59
Discharge: HOME | End: 2019-11-25
Attending: FAMILY MEDICINE | Admitting: FAMILY MEDICINE
Payer: COMMERCIAL

## 2019-11-25 DIAGNOSIS — G89.29 CHRONIC LEFT SHOULDER PAIN: ICD-10-CM

## 2019-11-25 DIAGNOSIS — M25.512 CHRONIC LEFT SHOULDER PAIN: ICD-10-CM

## 2019-11-25 PROCEDURE — 73221 MRI JOINT UPR EXTREM W/O DYE: CPT | Mod: LT

## 2019-11-25 NOTE — PROGRESS NOTES
SUBJECTIVE:   Alfie Otoole is left shoulder pain a 59 year old male who presents to clinic today for the following health issues: Left shoulder pain    Patient arrives here for left shoulder pain.  He cannot recall any injury.'s been going on for 8 to 10 months.  He has trouble sleeping.  Can reach behind his back.  He has undergone physical therapy and acupuncture without any improvement.  Abduction is limited.  And associate with pain.  Also external rotation gives him trouble        Patient Active Problem List    Diagnosis Date Noted     Constipation 06/21/2012     Priority: Medium     Esophageal reflux 06/21/2012     Priority: Medium     Past Medical History:   Diagnosis Date     Dorsalgia     Chronic back pain        Review of Systems     OBJECTIVE:     /80 (BP Location: Right arm, Patient Position: Sitting, Cuff Size: Adult Large)   Pulse 60   Temp 98.6  F (37  C)   Resp 16   Wt 103.1 kg (227 lb 6.4 oz)   BMI 28.42 kg/m    Body mass index is 28.42 kg/m .  Physical Exam  Musculoskeletal:      Comments: Decreased range of motion especially with abduction external rotation.  Associate with pain discomfort.   Skin:     General: Skin is warm.   Neurological:      General: No focal deficit present.      Mental Status: He is alert.         Diagnostic Test Results:  none     ASSESSMENT/PLAN:     Previous x-rays plain films were unremarkable.    1. Chronic left shoulder pain  Medication refilled including Celebrex.  We will proceed with MRI of the left shoulder  - MR Shoulder Left w/o Contrast; Future  - celecoxib (CELEBREX) 200 MG capsule; Take 1 capsule (200 mg) by mouth daily  Dispense: 90 capsule; Refill: 3      Alexis Forte MD  [facility]

## 2019-11-27 DIAGNOSIS — G89.29 CHRONIC LEFT SHOULDER PAIN: Primary | ICD-10-CM

## 2019-11-27 DIAGNOSIS — M25.512 CHRONIC LEFT SHOULDER PAIN: Primary | ICD-10-CM

## 2019-12-02 ENCOUNTER — HOSPITAL ENCOUNTER (OUTPATIENT)
Dept: ULTRASOUND IMAGING | Facility: OTHER | Age: 59
Discharge: HOME | End: 2019-12-02
Attending: RADIOLOGY | Admitting: RADIOLOGY
Payer: COMMERCIAL

## 2019-12-02 DIAGNOSIS — I83.811 VARICOSE VEINS OF LOWER EXTREMITY WITH PAIN, RIGHT: ICD-10-CM

## 2019-12-02 DIAGNOSIS — I83.812 VARICOSE VEINS OF LOWER EXTREMITY WITH PAIN, LEFT: ICD-10-CM

## 2019-12-02 PROCEDURE — 93970 EXTREMITY STUDY: CPT

## 2019-12-04 ENCOUNTER — OFFICE VISIT (OUTPATIENT)
Dept: ORTHOPEDICS | Facility: OTHER | Age: 59
End: 2019-12-04
Attending: FAMILY MEDICINE
Payer: COMMERCIAL

## 2019-12-04 DIAGNOSIS — G89.29 CHRONIC LEFT SHOULDER PAIN: ICD-10-CM

## 2019-12-04 DIAGNOSIS — M25.512 CHRONIC LEFT SHOULDER PAIN: ICD-10-CM

## 2019-12-04 PROCEDURE — G0463 HOSPITAL OUTPT CLINIC VISIT: HCPCS

## 2019-12-10 NOTE — PROGRESS NOTES
VITALS:   Height:   76  Weight:   225  Pulse rate:  60  Blood Pressure:  120 / 70      CHIEF COMPLAINT: Left Shoulder Pain     PROBLEMS:   Patient has no noted problems.    PATIENT REPORTED MEDICATIONS:   Patient has no noted medications.    Medications were reviewed with patient this visit.    PATIENT REPORTED ALLERGIES:   Patient has no noted allergies.    RISK FACTORS:  Tobacco use:   never smoker  Alcohol Use:   Yes    HISTORY OF PRESENT ILLNESS:    REASON FOR EVALUATION:  Left shoulder pain chronic.    HISTORY OF PRESENT ILLNESS:  Alfie comes in left shoulder pain chronic.  It has been going on for about 8 months or so.  No one traumatic event noted.  Has had pain and discomfort here in the anterolateral aspect, on and off here for the last 8 months or thereabouts.  Did try some therapy for about 5-6 weeks with minimal resolution.  Does use occasional Celebrex for management of this.  No injections have been done.  MRI scan was completed, showed AC arthrosis, type II acromion and some bursal fraying, plus SLAP lesion.  He is here to look at his options.  The majority of his pain is on the anterolateral aspect.  No one traumatic event prior to onset.  Overhead activity, reaching out and abducting across is the thing that mostly causes him discomfort at this time. Occasionally nighttime pain.  Pain is mild, but is very manageable.      PAST MEDICAL HISTORY:  The patient's health history form dated 12/04/19 was reviewed and signed.  Past medical history, medications, allergies, surgical history, social history, family history, and review of systems noted and scanned into EMR.  ALLERGIES:  No known medication allergies.  MEDICATIONS:  Occasional Celebrex.     PAST MEDICAL HISTORY:    No Past Medical History    PAST ORTHOPEDIC SURGICAL HISTORY:    No Past Orthopedic Surgical History    PAST SURGICAL HISTORY:    No Past Surgical History    FAMILY HISTORY:    Father:  CHF  Mother:  Atrial Fibrillation, Breast  "Cancer    SOCIAL HISTORY:   Marital Status:      Alcohol Use:  Rarely  Tobacco Use:  Never  Secondhand Smoke Exposure:  No  History of Blood Transfusion:  No  History of HIV:  No  History of Hepatitis:  No  History of IV Drug Use:  No    REVIEW OF SYSTEMS:  Joint or Muscle pain: Yes  Stiffness:  No  Swelling:  No  Difficulty in walking: No  Cold extremities: No  Weakness of muscles: Yes  Rash or Itching: No  Bruising:  No  Numbness/Tingling: No    PHYSICAL EXAMINATION:    On physical exam, the patient's height 6'4\", weight 225 pounds, pulse 60, blood pressure 120/70.  He is alert and oriented x3, cooperative and pleasant on my exam, in no acute distress.  Does ambulate with a satisfactory gait pattern.  Affect is appropriate.  Examination of the left shoulder shows full range of motion.  Pain with impingement testing.  Pain with crossarm adduction.  Speed testing is mildly symptomatic.  Abduction resistance is mostly symptomatic at this point in time.  No appreciable weakness on clinical examination with abduction or external strength testing at this time, nor any significant instability.  Spurling testing is negative.  Radial pulse otherwise 2+.      IMAGING:  X-rays and MRI are reviewed.  Does show type II acromion, with AC arthrosis and SLAP lesion.      ASSESSMENT:    IMPRESSION:  Left shoulder impingement, acromioclavicular arthrosis, underlying SLAP lesion.     PROCEDURES:   Risks and benefits of the procedure were reviewed with the patient. Informed consent was obtained. Blood sugar risks for our diabetic patients were also discussed.    After achieving informed consent and sterile preparation, the patient's left shoulder subacromial space is injected with 4 cc 1% lidocaine and 40 mg Kenalog under sterile conditions.  The patient did tolerate the procedure well.      PLAN:   At this time injection as stated above.  Continue rotator cuff strengthening exercise regimen.  If symptoms ultimately are not " getting better here within the next month or so, I would advise he does consider left shoulder arthroscopy, subacromial decompression, distal clavicle resection, and evaluation of SLAP lesion and indicated treatment there at this point in time.  He will keep us posted in regard to the need for that process and we would be looking at intervening in the springtime if necessary.      Dictated by:  Allan Rodriguez MD  Copy to:  Alexis Forte MD    D:  12/04/19  T:  12/10/19    Typed and/or reviewed and corrected by signing  below, and sent to the Physician for final review and signature.      This report was created using voice recording software and computer-generated templates. Although every effort has been made to review for and eliminate errors, some errors may still occur.         Electronically signed by Pauline Clemente on 12/10/2019 at 9:19 AM    ________________________________________________________________________

## 2019-12-27 ENCOUNTER — TELEPHONE (OUTPATIENT)
Dept: GENERAL RADIOLOGY | Facility: OTHER | Age: 59
End: 2019-12-27

## 2019-12-27 DIAGNOSIS — I83.92 VARICOSE VEINS OF LEFT LOWER EXTREMITY, UNSPECIFIED WHETHER COMPLICATED: Primary | ICD-10-CM

## 2019-12-27 NOTE — TELEPHONE ENCOUNTER
The patient has received an authorization for radiofrequency vein ablation. The patient is having 2 vessel(s) treated on separate days. For each procedure, we normally offer the option of receiving 5-10 mg oral valium as a light sedative 1 hour prior to procedure. We prefer the prescription go to our retail pharmacy here at [facility], for ease of patient . Thank you! :)    Registered Nurse  Diagnostic Imaging  [facility]  [phone]

## 2019-12-29 RX ORDER — DIAZEPAM 5 MG
TABLET ORAL
Qty: 4 TABLET | Refills: 0 | Status: SHIPPED | OUTPATIENT
Start: 2019-12-29 | End: 2020-09-02

## 2019-12-31 NOTE — PROGRESS NOTES
Outpatient Physical Therapy Discharge Note     Patient: Alfie Otoole  : 1960    Beginning/End Dates:  19 to 10/23/19    Referring Provider: Dr Forte    Therapy Diagnosis: Chronic left shoulder pain M25.512, G89.29      Plan:  Discharge from therapy.    Discharge:   Pt has not returned to physical therapy after a trial of self mgmt techniques indicating that he is doing well with HEP. Please refer to pt's chart for most recent information on objective measurements, goals or any additional information. This is an unplanned DC  Upon chart review, pt had a MRI on 19, see pt's chart for MRI results, and then an ortho consult with Dr Rodriguez the first week of Dec    Reason for Discharge:   Patient has not scheduled further appointments.    Discharge Plan: Patient to continue home program.

## 2020-01-14 ENCOUNTER — TELEPHONE (OUTPATIENT)
Dept: GENERAL RADIOLOGY | Facility: OTHER | Age: 60
End: 2020-01-14

## 2020-01-14 NOTE — TELEPHONE ENCOUNTER
Left message for patient that there is a prescription of valium at the [facility] retail pharmacy that he can  prior to his vein ablation procedure.

## 2020-03-11 ENCOUNTER — HEALTH MAINTENANCE LETTER (OUTPATIENT)
Age: 60
End: 2020-03-11

## 2020-06-24 DIAGNOSIS — Z01.818 PREOPERATIVE EVALUATION TO RULE OUT SURGICAL CONTRAINDICATION: ICD-10-CM

## 2020-06-24 DIAGNOSIS — Z01.89 ROUTINE LAB DRAW: Primary | ICD-10-CM

## 2020-06-25 ENCOUNTER — TELEPHONE (OUTPATIENT)
Dept: MRI IMAGING | Facility: OTHER | Age: 60
End: 2020-06-25

## 2020-06-29 ENCOUNTER — ALLIED HEALTH/NURSE VISIT (OUTPATIENT)
Dept: FAMILY MEDICINE | Facility: OTHER | Age: 60
End: 2020-06-29
Attending: FAMILY MEDICINE
Payer: COMMERCIAL

## 2020-06-29 ENCOUNTER — TELEPHONE (OUTPATIENT)
Dept: GENERAL RADIOLOGY | Facility: OTHER | Age: 60
End: 2020-06-29

## 2020-06-29 DIAGNOSIS — Z29.9 PROPHYLACTIC MEASURE: Primary | ICD-10-CM

## 2020-06-29 DIAGNOSIS — Z01.818 PRE-OPERATIVE EXAMINATION: Primary | ICD-10-CM

## 2020-06-29 PROCEDURE — U0003 INFECTIOUS AGENT DETECTION BY NUCLEIC ACID (DNA OR RNA); SEVERE ACUTE RESPIRATORY SYNDROME CORONAVIRUS 2 (SARS-COV-2) (CORONAVIRUS DISEASE [COVID-19]), AMPLIFIED PROBE TECHNIQUE, MAKING USE OF HIGH THROUGHPUT TECHNOLOGIES AS DESCRIBED BY CMS-2020-01-R: HCPCS | Performed by: RADIOLOGY

## 2020-06-29 PROCEDURE — C9803 HOPD COVID-19 SPEC COLLECT: HCPCS

## 2020-06-29 NOTE — TELEPHONE ENCOUNTER
Contact made Yes.   Which attempt is this? #1.  Call date: 06/29/2020  Call time: 1107 AM  Procedure date verified: Yes, 07/02/2020.  Arrival time verified: 1215 PM   Facility location verified: Yes, instructed to enter through main clinic entrance, wear a mask, and proceed to Diagnostic Registration.  COVID test: pending. If pending, patient transferred to [facility] for scheduling 72 hours prior to procedure and given instructions to call facility upon arrival as nursing staff will come out to vehicle for test.  Pre-procedure optimization completed within 30 days. Lab results were reviewed by this writer and are WNL.   needed? No  Language: N/A  Previous sedation/analgesia: No  Complications of sedation/analgesia?  No  CPAP machine use: No (If yes, instructed to bring CPAP to procedure)  Patient states an understanding of pre-procedure instructions.  Any anticoagulants or blood thinners? No  If yes, patient was instructed to follow MD orders for stopping anticoagulants or blood thinners.  Preprocedure teaching completed: Yes  Method: verbal per phone.  People present for teaching: Patient  Response to teaching: patient demonstrates understanding of procedure, conscious sedation and plan of care, as well as post-procedure instructions.  Transportation from procedure: Yes: self  Any questions or patient concerns? No

## 2020-06-29 NOTE — LETTER
June 30, 2020        Alfie Otoole  [address] [place] [id number]    This letter provides a written record that you were tested for COVID-19 on 6/29/20.       Your result was negative. This means that we didn t find the virus that causes COVID-19 in your sample. A test may show negative when you do actually have the virus. This can happen when the virus is in the early stages of infection, before you feel illness symptoms.    If you have symptoms   Stay home and away from others (self-isolate) until you meet ALL of the guidelines below:    You ve had no fever--and no medicine that reduces fever--for 3 full days (72 hours). And      Your other symptoms have gotten better. For example, your cough or breathing has improved. And     At least 10 days have passed since your symptoms started.    During this time:    Stay home. Don t go to work, school or anywhere else.     Stay in your own room, including for meals. Use your own bathroom if you can.    Stay away from others in your home. No hugging, kissing or shaking hands. No visitors.    Clean  high touch  surfaces often (doorknobs, counters, handles, etc.). Use a household cleaning spray or wipes. You can find a full list on the EPA website at www.epa.gov/pesticide-registration/list-n-disinfectants-use-against-sars-cov-2.    Cover your mouth and nose with a mask, tissue or washcloth to avoid spreading germs.    Wash your hands and face often with soap and water.    Going back to work  Check with your employer for any guidelines to follow for going back to work.    Employers: This document serves as formal notice that your employee tested negative for COVID-19, as of the testing date shown above.

## 2020-06-30 LAB
SARS-COV-2 RNA SPEC QL NAA+PROBE: NOT DETECTED
SPECIMEN SOURCE: NORMAL

## 2020-07-02 ENCOUNTER — HOSPITAL ENCOUNTER (OUTPATIENT)
Dept: GENERAL RADIOLOGY | Facility: OTHER | Age: 60
Discharge: HOME | End: 2020-07-02
Attending: RADIOLOGY | Admitting: FAMILY MEDICINE
Payer: COMMERCIAL

## 2020-07-02 VITALS
SYSTOLIC BLOOD PRESSURE: 126 MMHG | RESPIRATION RATE: 16 BRPM | TEMPERATURE: 97.5 F | DIASTOLIC BLOOD PRESSURE: 88 MMHG | HEART RATE: 61 BPM | OXYGEN SATURATION: 97 %

## 2020-07-02 DIAGNOSIS — I83.92 VARICOSE VEINS OF LEFT LOWER EXTREMITY, UNSPECIFIED WHETHER COMPLICATED: ICD-10-CM

## 2020-07-02 PROCEDURE — 25800030 ZZH RX IP 258 OP 636: Performed by: RADIOLOGY

## 2020-07-02 PROCEDURE — 25000132 ZZH RX MED GY IP 250 OP 250 PS 637: Performed by: RADIOLOGY

## 2020-07-02 PROCEDURE — 25000125 ZZHC RX 250: Performed by: RADIOLOGY

## 2020-07-02 PROCEDURE — 36475 ENDOVENOUS RF 1ST VEIN: CPT | Mod: LT

## 2020-07-02 RX ORDER — LIDOCAINE HYDROCHLORIDE 10 MG/ML
0-20 INJECTION, SOLUTION INFILTRATION; PERINEURAL
Status: COMPLETED | OUTPATIENT
Start: 2020-07-02 | End: 2020-07-02

## 2020-07-02 RX ORDER — DIAZEPAM 5 MG
10 TABLET ORAL
Status: DISCONTINUED | OUTPATIENT
Start: 2020-07-02 | End: 2020-07-03 | Stop reason: HOSPADM

## 2020-07-02 RX ORDER — FAMOTIDINE 20 MG/1
20 TABLET, FILM COATED ORAL DAILY
COMMUNITY

## 2020-07-02 RX ORDER — IBUPROFEN 400 MG/1
400 TABLET, FILM COATED ORAL
Status: COMPLETED | OUTPATIENT
Start: 2020-07-02 | End: 2020-07-02

## 2020-07-02 RX ADMIN — IBUPROFEN 400 MG: 400 TABLET ORAL at 14:11

## 2020-07-02 RX ADMIN — SODIUM CHLORIDE 100 ML: 9 INJECTION, SOLUTION INTRAVENOUS at 13:05

## 2020-07-02 RX ADMIN — LIDOCAINE HYDROCHLORIDE 3 ML: 10 INJECTION, SOLUTION INFILTRATION; PERINEURAL at 13:38

## 2020-07-02 RX ADMIN — SODIUM BICARBONATE: 84 INJECTION, SOLUTION INTRAVENOUS at 13:38

## 2020-07-02 RX ADMIN — NITROGLYCERIN 15 MG: 20 OINTMENT TOPICAL at 13:12

## 2020-07-02 NOTE — PROGRESS NOTES
Prior to the start of the procedure, left leg was cleansed with chlorhexidine in sterile fashion, then draped by rad tech. nitroglycerin paste was removed during cleansing of the leg.     Radiofrequency Ablation [employer]; Lot # [id number]    RFA catheter length: 60 cm    Element length: 3 cm    Introducer length: 7 cm    Time out:  1340    Cycles of treatment: 8 cm    Cm treated:  41.5 cm    Tumescent used:  75 mL    Total time: 2 min 40 sec    Skin of extremity where heating pad was placed is WDL.      Direct pressure was held to left leg upon removal of the catheter.    Catheter insertion site was covered with sterile 2x2 gauze and sterile tegaderm.    Compression stocking placed on left leg.

## 2020-07-02 NOTE — DISCHARGE INSTRUCTIONS
Radiofrequency Ablation    Your follow-up appointment is not yet scheduled. If you wish to have a follow-up as recommended by Dr. Conklin, please call [phone].  This exam will take approximately 45 minutes and you may drive yourself to this appointment.  Wear you compression stocking day and night until this appointment.  You may take them off to shower.      After your three day follow-up appointment, wear your stocking during the day and off at night for one week.    Activity:   *  Resume your normal activities today such as walking.   *  Walk at least three times per day for 20 minutes.  *  Avoid vigorous exercise or weight lifting for three days  *  No baths or hot tubs for one week  *  Avoid excessive sun or tanning booths  *  If you received Valium, avoid driving or drinking alcohol for 24 hours    Comfort:  *  You may use ice for the first 24 hours, only leave on for 20 minutes at a time    Diet:  *  Resume your normal diet    When to call your Physician:  *  Toes become discolored and numb  *  Excessive pain or increased pain with walking  *  Fever, chills, redness, drainage or warmth around your incision    It is normal to have bruising and tenderness.  You may also experience a pulling/or tugging sensation of your leg starting around the third or fourth day.      For questions, problems or concerns, contact [phone].

## 2020-07-02 NOTE — IP AVS SNAPSHOT
[facility]  [address]  [place] [address]  Phone:  [phone]  Fax:  [phone]                                    After Visit Summary   7/2/2020    Alfie Otoole    MRN: [id number]           After Visit Summary Signature Page    I have received my discharge instructions, and my questions have been answered. I have discussed any challenges I see with this plan with the nurse or doctor.    ..........................................................................................................................................  Patient/Patient Representative Signature      ..........................................................................................................................................  Patient Representative Print Name and Relationship to Patient    ..................................................               ................................................  Date                                   Time    ..........................................................................................................................................  Reviewed by Signature/Title    ...................................................              ..............................................  Date                                               Time          22EPIC Rev 08/18

## 2020-07-06 ENCOUNTER — HOSPITAL ENCOUNTER (OUTPATIENT)
Dept: ULTRASOUND IMAGING | Facility: OTHER | Age: 60
Discharge: HOME | End: 2020-07-06
Attending: RADIOLOGY | Admitting: RADIOLOGY
Payer: COMMERCIAL

## 2020-07-06 DIAGNOSIS — I83.893 VARICOSE VEINS OF BILATERAL LOWER EXTREMITIES WITH OTHER COMPLICATIONS: ICD-10-CM

## 2020-07-06 PROCEDURE — 93971 EXTREMITY STUDY: CPT | Mod: LT

## 2020-08-02 ENCOUNTER — ALLIED HEALTH/NURSE VISIT (OUTPATIENT)
Dept: FAMILY MEDICINE | Facility: OTHER | Age: 60
End: 2020-08-02
Payer: COMMERCIAL

## 2020-08-02 DIAGNOSIS — Z20.822 COVID-19 RULED OUT: Primary | ICD-10-CM

## 2020-08-02 LAB
LABORATORY COMMENT REPORT: NORMAL
SARS-COV-2 RNA SPEC QL NAA+PROBE: NEGATIVE
SARS-COV-2 RNA SPEC QL NAA+PROBE: NORMAL
SPECIMEN SOURCE: NORMAL
SPECIMEN SOURCE: NORMAL

## 2020-08-02 PROCEDURE — C9803 HOPD COVID-19 SPEC COLLECT: HCPCS

## 2020-08-02 PROCEDURE — 87635 SARS-COV-2 COVID-19 AMP PRB: CPT | Mod: ZL

## 2020-08-02 PROCEDURE — 99207 ZZC NO CHARGE NURSE ONLY: CPT

## 2020-08-31 DIAGNOSIS — R97.20 ELEVATED PSA: Primary | ICD-10-CM

## 2020-08-31 NOTE — PROGRESS NOTES
"Per last office visits with Davy Phelan MD 9/26/19 \"   Plan  Follow-up PSA in 1 year\"  Orders Placed  "

## 2020-09-01 DIAGNOSIS — Z13.220 ENCOUNTER FOR SCREENING FOR LIPOID DISORDERS: Primary | ICD-10-CM

## 2020-09-02 ENCOUNTER — OFFICE VISIT (OUTPATIENT)
Dept: UROLOGY | Facility: OTHER | Age: 60
End: 2020-09-02
Attending: UROLOGY
Payer: COMMERCIAL

## 2020-09-02 VITALS
SYSTOLIC BLOOD PRESSURE: 128 MMHG | BODY MASS INDEX: 28.95 KG/M2 | HEART RATE: 63 BPM | WEIGHT: 231.6 LBS | DIASTOLIC BLOOD PRESSURE: 88 MMHG | RESPIRATION RATE: 16 BRPM

## 2020-09-02 DIAGNOSIS — R97.20 ELEVATED PSA: ICD-10-CM

## 2020-09-02 DIAGNOSIS — R97.20 ELEVATED PSA: Primary | ICD-10-CM

## 2020-09-02 DIAGNOSIS — Z13.220 ENCOUNTER FOR SCREENING FOR LIPOID DISORDERS: ICD-10-CM

## 2020-09-02 LAB
ALBUMIN SERPL-MCNC: 4.1 G/DL (ref 3.5–5.7)
ALP SERPL-CCNC: 58 U/L (ref 34–104)
ALT SERPL W P-5'-P-CCNC: 17 U/L (ref 7–52)
ANION GAP SERPL CALCULATED.3IONS-SCNC: 5 MMOL/L (ref 3–14)
AST SERPL W P-5'-P-CCNC: 19 U/L (ref 13–39)
BASOPHILS # BLD AUTO: 0.1 10E9/L (ref 0–0.2)
BASOPHILS NFR BLD AUTO: 0.9 %
BILIRUB SERPL-MCNC: 0.6 MG/DL (ref 0.3–1)
BUN SERPL-MCNC: 16 MG/DL (ref 7–25)
CALCIUM SERPL-MCNC: 9 MG/DL (ref 8.6–10.3)
CHLORIDE SERPL-SCNC: 106 MMOL/L (ref 98–107)
CHOLEST SERPL-MCNC: 175 MG/DL
CO2 SERPL-SCNC: 28 MMOL/L (ref 21–31)
CREAT SERPL-MCNC: 1 MG/DL (ref 0.7–1.3)
DIFFERENTIAL METHOD BLD: NORMAL
EOSINOPHIL # BLD AUTO: 0.3 10E9/L (ref 0–0.7)
EOSINOPHIL NFR BLD AUTO: 4.6 %
ERYTHROCYTE [DISTWIDTH] IN BLOOD BY AUTOMATED COUNT: 12.4 % (ref 10–15)
GFR SERPL CREATININE-BSD FRML MDRD: 77 ML/MIN/{1.73_M2}
GLUCOSE SERPL-MCNC: 91 MG/DL (ref 70–105)
HCT VFR BLD AUTO: 41.5 % (ref 40–53)
HDLC SERPL-MCNC: 35 MG/DL (ref 23–92)
HGB BLD-MCNC: 14 G/DL (ref 13.3–17.7)
IMM GRANULOCYTES # BLD: 0 10E9/L (ref 0–0.4)
IMM GRANULOCYTES NFR BLD: 0.2 %
LDLC SERPL CALC-MCNC: 114 MG/DL
LYMPHOCYTES # BLD AUTO: 1.8 10E9/L (ref 0.8–5.3)
LYMPHOCYTES NFR BLD AUTO: 33.9 %
MCH RBC QN AUTO: 30.4 PG (ref 26.5–33)
MCHC RBC AUTO-ENTMCNC: 33.7 G/DL (ref 31.5–36.5)
MCV RBC AUTO: 90 FL (ref 78–100)
MONOCYTES # BLD AUTO: 0.4 10E9/L (ref 0–1.3)
MONOCYTES NFR BLD AUTO: 8.1 %
NEUTROPHILS # BLD AUTO: 2.8 10E9/L (ref 1.6–8.3)
NEUTROPHILS NFR BLD AUTO: 52.3 %
NONHDLC SERPL-MCNC: 140 MG/DL
PLATELET # BLD AUTO: 254 10E9/L (ref 150–450)
POTASSIUM SERPL-SCNC: 4.3 MMOL/L (ref 3.5–5.1)
PROT SERPL-MCNC: 6.7 G/DL (ref 6.4–8.9)
PSA SERPL-MCNC: 1.82 NG/ML
RBC # BLD AUTO: 4.61 10E12/L (ref 4.4–5.9)
SODIUM SERPL-SCNC: 139 MMOL/L (ref 134–144)
TRIGL SERPL-MCNC: 130 MG/DL
WBC # BLD AUTO: 5.4 10E9/L (ref 4–11)

## 2020-09-02 PROCEDURE — 80061 LIPID PANEL: CPT | Mod: ZL | Performed by: FAMILY MEDICINE

## 2020-09-02 PROCEDURE — 85025 COMPLETE CBC W/AUTO DIFF WBC: CPT | Mod: ZL | Performed by: FAMILY MEDICINE

## 2020-09-02 PROCEDURE — 36415 COLL VENOUS BLD VENIPUNCTURE: CPT | Mod: ZL | Performed by: FAMILY MEDICINE

## 2020-09-02 PROCEDURE — 84153 ASSAY OF PSA TOTAL: CPT | Mod: ZL | Performed by: FAMILY MEDICINE

## 2020-09-02 PROCEDURE — 99212 OFFICE O/P EST SF 10 MIN: CPT | Performed by: UROLOGY

## 2020-09-02 PROCEDURE — 80053 COMPREHEN METABOLIC PANEL: CPT | Mod: ZL | Performed by: FAMILY MEDICINE

## 2020-09-02 ASSESSMENT — PAIN SCALES - GENERAL: PAINLEVEL: NO PAIN (0)

## 2020-09-02 NOTE — NURSING NOTE
Pt presents to clinic for a one year elevated PSA follow up    Review of Systems:    Weight loss:    No     Recent fever/chills:  No   Night sweats:   No  Current skin rash:  No   Recent hair loss:  No  Heat intolerance:  No   Cold intolerance:  No  Chest pain:   No   Palpitations:   No  Shortness of breath:  No   Wheezing:   No  Constipation:    No   Diarrhea:   No   Nausea:   No   Vomiting:   No   Kidney/side pain:  No   Back pain:   No  Frequent headaches:  No   Dizziness:     No  Leg swelling:   No   Calf pain:    No

## 2020-09-02 NOTE — PROGRESS NOTES
Type of Visit  EST    Chief Complaint  Elevated PSA    HPI  Mr. Otoole is a 59 year old male who follows up with an elevated PSA.  He does not have a family history of prostate cancer involving a primary relative however his maternal grandfather was diagnosed with prostate cancer.  The patient has not previously undergone prostate biopsy.  No associated worsening LUTS, dysuria or prostatitis at the time of the PSA.  The most recent PSA was collected earlier today.  Last year he did have a slightly elevated value which we elected to observe.  No new symptoms such as progressive BPH, dysuria or gross hematuria.      Family History  Family History   Problem Relation Age of Onset     Heart Disease Father         Heart Disease,CHF     Breast Cancer Mother         Cancer-breast     Heart Disease Mother         Heart Disease,afib     Prostate Cancer Maternal Grandfather         Cancer-prostate     Coronary Artery Disease Maternal Uncle         Coronary artery disease,smoker       Review of Systems  I personally reviewed the ROS with the patient.    Nursing Notes:   Vani Ahn LPN  9/2/2020  2:54 PM  Signed  Pt presents to clinic for a one year elevated PSA follow up    Review of Systems:    Weight loss:    No     Recent fever/chills:  No   Night sweats:   No  Current skin rash:  No   Recent hair loss:  No  Heat intolerance:  No   Cold intolerance:  No  Chest pain:   No   Palpitations:   No  Shortness of breath:  No   Wheezing:   No  Constipation:    No   Diarrhea:   No   Nausea:   No   Vomiting:   No   Kidney/side pain:  No   Back pain:   No  Frequent headaches:  No   Dizziness:     No  Leg swelling:   No   Calf pain:    No          Physical Exam  Vitals:    09/02/20 1444   BP: 128/88   BP Location: Right arm   Patient Position: Sitting   Cuff Size: Adult Regular   Pulse: 63   Resp: 16   Weight: 105.1 kg (231 lb 9.6 oz)     Constitutional: No acute distress.  Alert and cooperative   Head: NCAT  Eyes: Conjunctivae  normal  Cardiovascular: Regular rate.  Pulmonary/Chest: Respirations are even and non-labored bilaterally, no audible wheezing  Abdominal: Soft. No distension, tenderness, masses or guarding.   Neurological: A + O x 3.  Cranial Nerves II-XII grossly intact.  Extremities: NATANAEL x 4, Warm. No clubbing.  No cyanosis.    Skin: Pink, warm and dry.  No visible rashes noted.  Psychiatric:  Normal mood and affect  Back:  No left CVA tenderness.  No right CVA tenderness.  Genitourinary:  Nonpalpable bladder  SCOTT: 20-30g smooth, symmetric, no nodules, no induration    Labs  Results for DIAMANTE OLVERA (MRN [id number]) as of 9/2/2020 14:54   9/2/2020 08:55   PSA 1.825     Results for DIAMANTE OLVERA (MRN [id number]) as of 9/26/2019 09:00   11/21/2017 12:12 8/27/2019 09:15   PSA 2.788 3.019       Assessment & Plan  Mr. Olvera is a 59 year old male who follows up with resolved elevated PSA.  Patient plans to continue PSA screening through his PCP.  I reassured him that the value from this morning is normal.

## 2020-12-15 DIAGNOSIS — G89.29 CHRONIC LEFT SHOULDER PAIN: ICD-10-CM

## 2020-12-15 DIAGNOSIS — M25.512 CHRONIC LEFT SHOULDER PAIN: ICD-10-CM

## 2020-12-15 RX ORDER — CELECOXIB 200 MG/1
CAPSULE ORAL
Qty: 90 CAPSULE | Refills: 3 | Status: SHIPPED | OUTPATIENT
Start: 2020-12-15 | End: 2022-02-16

## 2021-01-03 ENCOUNTER — HEALTH MAINTENANCE LETTER (OUTPATIENT)
Age: 61
End: 2021-01-03

## 2021-04-25 ENCOUNTER — HEALTH MAINTENANCE LETTER (OUTPATIENT)
Age: 61
End: 2021-04-25

## 2021-10-07 ENCOUNTER — IMMUNIZATION (OUTPATIENT)
Dept: FAMILY MEDICINE | Facility: OTHER | Age: 61
End: 2021-10-07
Attending: FAMILY MEDICINE
Payer: COMMERCIAL

## 2021-10-09 ENCOUNTER — HEALTH MAINTENANCE LETTER (OUTPATIENT)
Age: 61
End: 2021-10-09

## 2022-02-16 DIAGNOSIS — M25.512 CHRONIC LEFT SHOULDER PAIN: ICD-10-CM

## 2022-02-16 DIAGNOSIS — G89.29 CHRONIC LEFT SHOULDER PAIN: ICD-10-CM

## 2022-02-16 RX ORDER — CELECOXIB 200 MG/1
CAPSULE ORAL
Qty: 90 CAPSULE | Refills: 3 | Status: SHIPPED | OUTPATIENT
Start: 2022-02-16

## 2022-02-16 NOTE — LETTER
February 16, 2022      Alfie Otoole  [address]   [place] [address]        Dear Alfie,     This letter is to remind you that you are due for your annual exam with Alexis Forte. Your last comprehensive visit was more than 12 months ago.    A LIMITED refill of celebrex has been called into your pharmacy. Additional refills require you to complete this appointment.    Please call the clinic at [phone] to schedule your appointment.    If you should require additional refills before your scheduled appointment, please contact your pharmacy and we will refill your medication until the date of your appointment.    Thank you for choosing [facility] for your health care needs.    Sincerely,    Refill RN  [facility]

## 2022-02-16 NOTE — TELEPHONE ENCOUNTER
[facility] sent Rx request for the following:      Requested Prescriptions   Pending Prescriptions Disp Refills     celecoxib (CELEBREX) 200 MG capsule [Pharmacy Med Name: celecoxib 200 mg capsule] 90 capsule 3     Sig: TAKE 1 CAPSULE BY MOUTH ONCE DAILY       NSAID Medications Failed - 2/16/2022 12:32 PM        Failed - Blood pressure under 140/90 in past 12 months     BP Readings from Last 3 Encounters:   09/02/20 128/88   07/02/20 126/88   11/21/19 102/80           Failed - Normal ALT on file in past 12 months     Recent Labs   Lab Test 09/02/20  0855 11/21/17  1146   ALT 17  --    GICHALT  --  21           Failed - Normal AST on file in past 12 months     Recent Labs   Lab Test 09/02/20  0855 11/21/17  1146   AST 19  --    GICHAST  --  23           Failed - Normal CBC on file in past 12 months     Recent Labs   Lab Test 09/02/20  0855 11/21/17  1125   WBC 5.4  --    GICHWBC  --  6.2   RBC 4.61  --    GICHRBC  --  4.80   HGB 14.0 14.7   HCT 41.5 43.4    256           Failed - Normal serum creatinine on file in past 12 months     Recent Labs   Lab Test 09/02/20  0855   CR 1.00       Ok to refill medication if creatinine is low          Last Prescription Date:   12/15/2020  Last Fill Qty/Refills:         90, R-3   Last Office Visit:              11/21/2019  Future Office visit:           None  Routing refill request to provider for review/approval because:  Letter sent to patient as a reminder to make an appointment for a annual follow up. Does not meet RN refill criteria due to labs being due and failure of Blood pressure. Unable to complete prescription refill per RN Medication Refill Policy.   Ivette Archer RN on 2/16/2022 at 1:41 PM

## 2022-05-21 ENCOUNTER — HEALTH MAINTENANCE LETTER (OUTPATIENT)
Age: 62
End: 2022-05-21

## 2022-08-23 DIAGNOSIS — Z13.220 ENCOUNTER FOR SCREENING FOR LIPOID DISORDERS: Primary | ICD-10-CM

## 2022-08-23 DIAGNOSIS — Z12.11 SPECIAL SCREENING FOR MALIGNANT NEOPLASMS, COLON: ICD-10-CM

## 2022-08-23 DIAGNOSIS — Z13.9 SCREENING FOR CONDITION: ICD-10-CM

## 2022-09-07 LAB — NONINV COLON CA DNA+OCC BLD SCRN STL QL: NEGATIVE

## 2022-09-08 ENCOUNTER — LAB (OUTPATIENT)
Dept: LAB | Facility: OTHER | Age: 62
End: 2022-09-08
Attending: FAMILY MEDICINE
Payer: COMMERCIAL

## 2022-09-08 DIAGNOSIS — Z13.220 ENCOUNTER FOR SCREENING FOR LIPOID DISORDERS: ICD-10-CM

## 2022-09-08 DIAGNOSIS — Z13.9 SCREENING FOR CONDITION: ICD-10-CM

## 2022-09-08 LAB
ALBUMIN SERPL-MCNC: 4.2 G/DL (ref 3.5–5.7)
ALP SERPL-CCNC: 49 U/L (ref 34–104)
ALT SERPL W P-5'-P-CCNC: 14 U/L (ref 7–52)
ANION GAP SERPL CALCULATED.3IONS-SCNC: 5 MMOL/L (ref 3–14)
AST SERPL W P-5'-P-CCNC: 20 U/L (ref 13–39)
BASOPHILS # BLD AUTO: 0.1 10E3/UL (ref 0–0.2)
BASOPHILS NFR BLD AUTO: 1 %
BILIRUB SERPL-MCNC: 0.7 MG/DL (ref 0.3–1)
BUN SERPL-MCNC: 17 MG/DL (ref 7–25)
CALCIUM SERPL-MCNC: 9.3 MG/DL (ref 8.6–10.3)
CHLORIDE BLD-SCNC: 105 MMOL/L (ref 98–107)
CHOLEST SERPL-MCNC: 166 MG/DL
CO2 SERPL-SCNC: 28 MMOL/L (ref 21–31)
CREAT SERPL-MCNC: 0.96 MG/DL (ref 0.7–1.3)
EOSINOPHIL # BLD AUTO: 0.2 10E3/UL (ref 0–0.7)
EOSINOPHIL NFR BLD AUTO: 3 %
ERYTHROCYTE [DISTWIDTH] IN BLOOD BY AUTOMATED COUNT: 12.8 % (ref 10–15)
FASTING STATUS PATIENT QL REPORTED: ABNORMAL
GFR SERPL CREATININE-BSD FRML MDRD: 90 ML/MIN/1.73M2
GLUCOSE BLD-MCNC: 88 MG/DL (ref 70–105)
HCT VFR BLD AUTO: 43 % (ref 40–53)
HDLC SERPL-MCNC: 40 MG/DL (ref 23–92)
HGB BLD-MCNC: 14.3 G/DL (ref 13.3–17.7)
IMM GRANULOCYTES # BLD: 0 10E3/UL
IMM GRANULOCYTES NFR BLD: 1 %
LDLC SERPL CALC-MCNC: 105 MG/DL
LYMPHOCYTES # BLD AUTO: 2 10E3/UL (ref 0.8–5.3)
LYMPHOCYTES NFR BLD AUTO: 30 %
MCH RBC QN AUTO: 30.2 PG (ref 26.5–33)
MCHC RBC AUTO-ENTMCNC: 33.3 G/DL (ref 31.5–36.5)
MCV RBC AUTO: 91 FL (ref 78–100)
MONOCYTES # BLD AUTO: 0.6 10E3/UL (ref 0–1.3)
MONOCYTES NFR BLD AUTO: 9 %
NEUTROPHILS # BLD AUTO: 3.7 10E3/UL (ref 1.6–8.3)
NEUTROPHILS NFR BLD AUTO: 56 %
NONHDLC SERPL-MCNC: 126 MG/DL
NRBC # BLD AUTO: 0 10E3/UL
NRBC BLD AUTO-RTO: 0 /100
PLATELET # BLD AUTO: 238 10E3/UL (ref 150–450)
POTASSIUM BLD-SCNC: 4 MMOL/L (ref 3.5–5.1)
PROT SERPL-MCNC: 6.8 G/DL (ref 6.4–8.9)
PSA SERPL-MCNC: 2.58 UG/L (ref 0–4)
RBC # BLD AUTO: 4.74 10E6/UL (ref 4.4–5.9)
SODIUM SERPL-SCNC: 138 MMOL/L (ref 134–144)
TRIGL SERPL-MCNC: 107 MG/DL
WBC # BLD AUTO: 6.5 10E3/UL (ref 4–11)

## 2022-09-08 PROCEDURE — 84132 ASSAY OF SERUM POTASSIUM: CPT | Mod: ZL

## 2022-09-08 PROCEDURE — 84155 ASSAY OF PROTEIN SERUM: CPT | Mod: ZL

## 2022-09-08 PROCEDURE — 86803 HEPATITIS C AB TEST: CPT | Mod: ZL

## 2022-09-08 PROCEDURE — 85025 COMPLETE CBC W/AUTO DIFF WBC: CPT | Mod: ZL

## 2022-09-08 PROCEDURE — 36415 COLL VENOUS BLD VENIPUNCTURE: CPT | Mod: ZL

## 2022-09-08 PROCEDURE — G0103 PSA SCREENING: HCPCS | Mod: ZL

## 2022-09-08 PROCEDURE — 87389 HIV-1 AG W/HIV-1&-2 AB AG IA: CPT | Mod: ZL

## 2022-09-08 PROCEDURE — 80061 LIPID PANEL: CPT | Mod: ZL

## 2022-09-09 LAB
HCV AB SERPL QL IA: NONREACTIVE
HIV 1+2 AB+HIV1 P24 AG SERPL QL IA: NONREACTIVE

## 2022-09-17 ENCOUNTER — HEALTH MAINTENANCE LETTER (OUTPATIENT)
Age: 62
End: 2022-09-17

## 2023-04-11 DIAGNOSIS — H91.13 PRESBYCUSIS OF BOTH EARS: ICD-10-CM

## 2023-04-11 DIAGNOSIS — N52.9 IMPOTENCE OF ORGANIC ORIGIN: ICD-10-CM

## 2023-04-11 RX ORDER — SILDENAFIL 50 MG/1
50 TABLET, FILM COATED ORAL DAILY PRN
Qty: 30 TABLET | Refills: 3 | Status: SHIPPED | OUTPATIENT
Start: 2023-04-11

## 2023-06-04 ENCOUNTER — HEALTH MAINTENANCE LETTER (OUTPATIENT)
Age: 63
End: 2023-06-04

## 2024-07-13 ENCOUNTER — HEALTH MAINTENANCE LETTER (OUTPATIENT)
Age: 64
End: 2024-07-13

## 2024-12-12 ENCOUNTER — MYC MEDICAL ADVICE (OUTPATIENT)
Dept: FAMILY MEDICINE | Facility: OTHER | Age: 64
End: 2024-12-12

## 2024-12-12 DIAGNOSIS — Z00.00 ROUTINE GENERAL MEDICAL EXAMINATION AT A HEALTH CARE FACILITY: Primary | ICD-10-CM

## 2024-12-12 DIAGNOSIS — Z12.5 PROSTATE CANCER SCREENING: ICD-10-CM

## 2024-12-12 DIAGNOSIS — K21.9 GASTROESOPHAGEAL REFLUX DISEASE WITHOUT ESOPHAGITIS: ICD-10-CM

## 2024-12-13 NOTE — TELEPHONE ENCOUNTER
Annual Wellness Visit never done.     Last time he saw Dr. Forte was in 2019.      Has not been seen at [facility] since.      Labs last ordered in 2022 by Dr. Forte.      Vani Kelly RN on 12/13/2024 at 10:32 AM

## 2025-07-10 ENCOUNTER — LAB (OUTPATIENT)
Dept: LAB | Facility: OTHER | Age: 65
End: 2025-07-10
Payer: COMMERCIAL

## 2025-07-10 DIAGNOSIS — Z12.5 PROSTATE CANCER SCREENING: ICD-10-CM

## 2025-07-10 DIAGNOSIS — Z00.00 ROUTINE GENERAL MEDICAL EXAMINATION AT A HEALTH CARE FACILITY: ICD-10-CM

## 2025-07-10 DIAGNOSIS — K21.9 GASTROESOPHAGEAL REFLUX DISEASE WITHOUT ESOPHAGITIS: ICD-10-CM

## 2025-07-10 LAB
ALBUMIN SERPL BCG-MCNC: 4.2 G/DL (ref 3.5–5.2)
ALP SERPL-CCNC: 67 U/L (ref 40–150)
ALT SERPL W P-5'-P-CCNC: 18 U/L (ref 0–70)
ANION GAP SERPL CALCULATED.3IONS-SCNC: 11 MMOL/L (ref 7–15)
AST SERPL W P-5'-P-CCNC: 25 U/L (ref 0–45)
BASOPHILS # BLD AUTO: 0.1 10E3/UL (ref 0–0.2)
BASOPHILS NFR BLD AUTO: 1 %
BILIRUB SERPL-MCNC: 0.5 MG/DL
BUN SERPL-MCNC: 13.4 MG/DL (ref 8–23)
CALCIUM SERPL-MCNC: 9.3 MG/DL (ref 8.8–10.4)
CHLORIDE SERPL-SCNC: 104 MMOL/L (ref 98–107)
CHOLEST SERPL-MCNC: 190 MG/DL
CREAT SERPL-MCNC: 0.88 MG/DL (ref 0.67–1.17)
EGFRCR SERPLBLD CKD-EPI 2021: >90 ML/MIN/1.73M2
EOSINOPHIL # BLD AUTO: 0.4 10E3/UL (ref 0–0.7)
EOSINOPHIL NFR BLD AUTO: 7 %
ERYTHROCYTE [DISTWIDTH] IN BLOOD BY AUTOMATED COUNT: 12.9 % (ref 10–15)
FASTING STATUS PATIENT QL REPORTED: YES
FASTING STATUS PATIENT QL REPORTED: YES
GLUCOSE SERPL-MCNC: 96 MG/DL (ref 70–99)
HCO3 SERPL-SCNC: 24 MMOL/L (ref 22–29)
HCT VFR BLD AUTO: 41.2 % (ref 40–53)
HDLC SERPL-MCNC: 44 MG/DL
HGB BLD-MCNC: 13.9 G/DL (ref 13.3–17.7)
IMM GRANULOCYTES # BLD: 0 10E3/UL
IMM GRANULOCYTES NFR BLD: 0 %
LDLC SERPL CALC-MCNC: 118 MG/DL
LYMPHOCYTES # BLD AUTO: 2.3 10E3/UL (ref 0.8–5.3)
LYMPHOCYTES NFR BLD AUTO: 38 %
MCH RBC QN AUTO: 30.2 PG (ref 26.5–33)
MCHC RBC AUTO-ENTMCNC: 33.7 G/DL (ref 31.5–36.5)
MCV RBC AUTO: 89 FL (ref 78–100)
MONOCYTES # BLD AUTO: 0.5 10E3/UL (ref 0–1.3)
MONOCYTES NFR BLD AUTO: 8 %
NEUTROPHILS # BLD AUTO: 2.8 10E3/UL (ref 1.6–8.3)
NEUTROPHILS NFR BLD AUTO: 46 %
NONHDLC SERPL-MCNC: 146 MG/DL
NRBC # BLD AUTO: 0 10E3/UL
NRBC BLD AUTO-RTO: 0 /100
PLATELET # BLD AUTO: 256 10E3/UL (ref 150–450)
POTASSIUM SERPL-SCNC: 4.2 MMOL/L (ref 3.4–5.3)
PROT SERPL-MCNC: 7 G/DL (ref 6.4–8.3)
PSA SERPL DL<=0.01 NG/ML-MCNC: 2.38 NG/ML (ref 0–4.5)
RBC # BLD AUTO: 4.61 10E6/UL (ref 4.4–5.9)
SODIUM SERPL-SCNC: 139 MMOL/L (ref 135–145)
TRIGL SERPL-MCNC: 140 MG/DL
WBC # BLD AUTO: 6 10E3/UL (ref 4–11)

## 2025-07-10 PROCEDURE — 84155 ASSAY OF PROTEIN SERUM: CPT | Mod: ZL

## 2025-07-10 PROCEDURE — 80061 LIPID PANEL: CPT | Mod: ZL

## 2025-07-10 PROCEDURE — 85004 AUTOMATED DIFF WBC COUNT: CPT | Mod: ZL

## 2025-07-10 PROCEDURE — 36415 COLL VENOUS BLD VENIPUNCTURE: CPT | Mod: ZL

## 2025-07-10 PROCEDURE — G0103 PSA SCREENING: HCPCS | Mod: ZL
